# Patient Record
Sex: MALE
[De-identification: names, ages, dates, MRNs, and addresses within clinical notes are randomized per-mention and may not be internally consistent; named-entity substitution may affect disease eponyms.]

---

## 2021-08-26 ENCOUNTER — TRANSCRIPTION ENCOUNTER (OUTPATIENT)
Age: 66
End: 2021-08-26

## 2021-10-09 ENCOUNTER — TRANSCRIPTION ENCOUNTER (OUTPATIENT)
Age: 66
End: 2021-10-09

## 2022-07-24 ENCOUNTER — NON-APPOINTMENT (OUTPATIENT)
Age: 67
End: 2022-07-24

## 2023-12-22 ENCOUNTER — TRANSCRIPTION ENCOUNTER (OUTPATIENT)
Age: 68
End: 2023-12-22

## 2023-12-25 ENCOUNTER — HOSPITAL ENCOUNTER (INPATIENT)
Facility: HOSPITAL | Age: 68
LOS: 6 days | Discharge: HOME | DRG: 378 | End: 2023-12-31
Attending: EMERGENCY MEDICINE | Admitting: STUDENT IN AN ORGANIZED HEALTH CARE EDUCATION/TRAINING PROGRAM
Payer: MEDICARE

## 2023-12-25 DIAGNOSIS — D64.9 ANEMIA: ICD-10-CM

## 2023-12-25 DIAGNOSIS — K92.2 UPPER GI BLEED: ICD-10-CM

## 2023-12-25 DIAGNOSIS — K92.2 GASTROINTESTINAL HEMORRHAGE, UNSPECIFIED GASTROINTESTINAL HEMORRHAGE TYPE: Primary | ICD-10-CM

## 2023-12-25 PROBLEM — D62 ACUTE BLOOD LOSS ANEMIA (ABLA): Status: ACTIVE | Noted: 2023-12-25

## 2023-12-25 PROBLEM — R55 SYNCOPE: Status: ACTIVE | Noted: 2023-12-25

## 2023-12-25 PROBLEM — I25.10 CAD (CORONARY ARTERY DISEASE): Status: ACTIVE | Noted: 2023-12-25

## 2023-12-25 PROBLEM — R93.89 ABNORMAL CT OF THE CHEST: Chronic | Status: ACTIVE | Noted: 2019-02-27

## 2023-12-25 LAB
2HR DELTA HS TROPONIN: 1 NG/L
4HR DELTA HS TROPONIN: 0 NG/L
ABO GROUP BLD: NORMAL
ABO GROUP BLD: NORMAL
ANION GAP SERPL CALCULATED.3IONS-SCNC: 5 MMOL/L
ATRIAL RATE: 89 BPM
BASOPHILS # BLD AUTO: 0.04 THOUSANDS/ÂΜL (ref 0–0.1)
BASOPHILS NFR BLD AUTO: 0 % (ref 0–1)
BLD GP AB SCN SERPL QL: NEGATIVE
BUN SERPL-MCNC: 56 MG/DL (ref 5–25)
CALCIUM SERPL-MCNC: 7.7 MG/DL (ref 8.4–10.2)
CARDIAC TROPONIN I PNL SERPL HS: 8 NG/L
CARDIAC TROPONIN I PNL SERPL HS: 8 NG/L
CARDIAC TROPONIN I PNL SERPL HS: 9 NG/L
CHLORIDE SERPL-SCNC: 110 MMOL/L (ref 96–108)
CO2 SERPL-SCNC: 25 MMOL/L (ref 21–32)
CREAT SERPL-MCNC: 0.88 MG/DL (ref 0.6–1.3)
EOSINOPHIL # BLD AUTO: 0.06 THOUSAND/ÂΜL (ref 0–0.61)
EOSINOPHIL NFR BLD AUTO: 1 % (ref 0–6)
ERYTHROCYTE [DISTWIDTH] IN BLOOD BY AUTOMATED COUNT: 12.7 % (ref 11.6–15.1)
ERYTHROCYTE [DISTWIDTH] IN BLOOD BY AUTOMATED COUNT: 13.2 % (ref 11.6–15.1)
ERYTHROCYTE [DISTWIDTH] IN BLOOD BY AUTOMATED COUNT: 13.6 % (ref 11.6–15.1)
EXT FECAL OCCULT BLOOD SCREEN: POSITIVE
EXT. CONTROL: ABNORMAL
FERRITIN SERPL-MCNC: 111 NG/ML (ref 24–336)
GFR SERPL CREATININE-BSD FRML MDRD: 88 ML/MIN/1.73SQ M
GLUCOSE SERPL-MCNC: 124 MG/DL (ref 65–140)
HCT VFR BLD AUTO: 18.8 % (ref 36.5–49.3)
HCT VFR BLD AUTO: 19.5 % (ref 36.5–49.3)
HCT VFR BLD AUTO: 21.1 % (ref 36.5–49.3)
HGB BLD-MCNC: 6.2 G/DL (ref 12–17)
HGB BLD-MCNC: 6.6 G/DL (ref 12–17)
HGB BLD-MCNC: 7.2 G/DL (ref 12–17)
IMM GRANULOCYTES # BLD AUTO: 0.05 THOUSAND/UL (ref 0–0.2)
IMM GRANULOCYTES NFR BLD AUTO: 1 % (ref 0–2)
IRON SATN MFR SERPL: 65 % (ref 15–50)
IRON SERPL-MCNC: 153 UG/DL (ref 50–212)
LYMPHOCYTES # BLD AUTO: 2.04 THOUSANDS/ÂΜL (ref 0.6–4.47)
LYMPHOCYTES NFR BLD AUTO: 20 % (ref 14–44)
MAGNESIUM SERPL-MCNC: 1.8 MG/DL (ref 1.9–2.7)
MCH RBC QN AUTO: 30.7 PG (ref 26.8–34.3)
MCH RBC QN AUTO: 31 PG (ref 26.8–34.3)
MCH RBC QN AUTO: 31.2 PG (ref 26.8–34.3)
MCHC RBC AUTO-ENTMCNC: 33 G/DL (ref 31.4–37.4)
MCHC RBC AUTO-ENTMCNC: 33.8 G/DL (ref 31.4–37.4)
MCHC RBC AUTO-ENTMCNC: 34.1 G/DL (ref 31.4–37.4)
MCV RBC AUTO: 91 FL (ref 82–98)
MCV RBC AUTO: 91 FL (ref 82–98)
MCV RBC AUTO: 94 FL (ref 82–98)
MONOCYTES # BLD AUTO: 0.69 THOUSAND/ÂΜL (ref 0.17–1.22)
MONOCYTES NFR BLD AUTO: 7 % (ref 4–12)
NEUTROPHILS # BLD AUTO: 7.28 THOUSANDS/ÂΜL (ref 1.85–7.62)
NEUTS SEG NFR BLD AUTO: 71 % (ref 43–75)
NRBC BLD AUTO-RTO: 0 /100 WBCS
P AXIS: 73 DEGREES
PLATELET # BLD AUTO: 134 THOUSANDS/UL (ref 149–390)
PLATELET # BLD AUTO: 134 THOUSANDS/UL (ref 149–390)
PLATELET # BLD AUTO: 142 THOUSANDS/UL (ref 149–390)
PMV BLD AUTO: 10.8 FL (ref 8.9–12.7)
PMV BLD AUTO: 11.1 FL (ref 8.9–12.7)
PMV BLD AUTO: 11.2 FL (ref 8.9–12.7)
POTASSIUM SERPL-SCNC: 3.9 MMOL/L (ref 3.5–5.3)
PR INTERVAL: 162 MS
QRS AXIS: 77 DEGREES
QRSD INTERVAL: 96 MS
QT INTERVAL: 390 MS
QTC INTERVAL: 474 MS
RBC # BLD AUTO: 2 MILLION/UL (ref 3.88–5.62)
RBC # BLD AUTO: 2.15 MILLION/UL (ref 3.88–5.62)
RBC # BLD AUTO: 2.31 MILLION/UL (ref 3.88–5.62)
RH BLD: POSITIVE
RH BLD: POSITIVE
SODIUM SERPL-SCNC: 140 MMOL/L (ref 135–147)
SPECIMEN EXPIRATION DATE: NORMAL
T WAVE AXIS: 77 DEGREES
TIBC SERPL-MCNC: 236 UG/DL (ref 250–450)
UIBC SERPL-MCNC: 83 UG/DL (ref 155–355)
VENTRICULAR RATE: 89 BPM
WBC # BLD AUTO: 10.16 THOUSAND/UL (ref 4.31–10.16)
WBC # BLD AUTO: 11.01 THOUSAND/UL (ref 4.31–10.16)
WBC # BLD AUTO: 11.41 THOUSAND/UL (ref 4.31–10.16)

## 2023-12-25 PROCEDURE — 99233 SBSQ HOSP IP/OBS HIGH 50: CPT | Performed by: INTERNAL MEDICINE

## 2023-12-25 PROCEDURE — C9113 INJ PANTOPRAZOLE SODIUM, VIA: HCPCS | Performed by: EMERGENCY MEDICINE

## 2023-12-25 PROCEDURE — 82272 OCCULT BLD FECES 1-3 TESTS: CPT

## 2023-12-25 PROCEDURE — 86920 COMPATIBILITY TEST SPIN: CPT

## 2023-12-25 PROCEDURE — 86901 BLOOD TYPING SEROLOGIC RH(D): CPT | Performed by: EMERGENCY MEDICINE

## 2023-12-25 PROCEDURE — 99223 1ST HOSP IP/OBS HIGH 75: CPT | Performed by: INTERNAL MEDICINE

## 2023-12-25 PROCEDURE — 86850 RBC ANTIBODY SCREEN: CPT | Performed by: EMERGENCY MEDICINE

## 2023-12-25 PROCEDURE — 85027 COMPLETE CBC AUTOMATED: CPT | Performed by: INTERNAL MEDICINE

## 2023-12-25 PROCEDURE — 30233N1 TRANSFUSION OF NONAUTOLOGOUS RED BLOOD CELLS INTO PERIPHERAL VEIN, PERCUTANEOUS APPROACH: ICD-10-PCS | Performed by: INTERNAL MEDICINE

## 2023-12-25 PROCEDURE — 83540 ASSAY OF IRON: CPT

## 2023-12-25 PROCEDURE — 86900 BLOOD TYPING SEROLOGIC ABO: CPT | Performed by: EMERGENCY MEDICINE

## 2023-12-25 PROCEDURE — 85025 COMPLETE CBC W/AUTO DIFF WBC: CPT | Performed by: EMERGENCY MEDICINE

## 2023-12-25 PROCEDURE — 99285 EMERGENCY DEPT VISIT HI MDM: CPT

## 2023-12-25 PROCEDURE — 83735 ASSAY OF MAGNESIUM: CPT | Performed by: EMERGENCY MEDICINE

## 2023-12-25 PROCEDURE — C9113 INJ PANTOPRAZOLE SODIUM, VIA: HCPCS | Performed by: INTERNAL MEDICINE

## 2023-12-25 PROCEDURE — 82728 ASSAY OF FERRITIN: CPT

## 2023-12-25 PROCEDURE — 96360 HYDRATION IV INFUSION INIT: CPT

## 2023-12-25 PROCEDURE — P9016 RBC LEUKOCYTES REDUCED: HCPCS

## 2023-12-25 PROCEDURE — 99222 1ST HOSP IP/OBS MODERATE 55: CPT | Performed by: INTERNAL MEDICINE

## 2023-12-25 PROCEDURE — 80048 BASIC METABOLIC PNL TOTAL CA: CPT | Performed by: EMERGENCY MEDICINE

## 2023-12-25 PROCEDURE — 93005 ELECTROCARDIOGRAM TRACING: CPT

## 2023-12-25 PROCEDURE — 82270 OCCULT BLOOD FECES: CPT | Performed by: EMERGENCY MEDICINE

## 2023-12-25 PROCEDURE — 84484 ASSAY OF TROPONIN QUANT: CPT | Performed by: EMERGENCY MEDICINE

## 2023-12-25 PROCEDURE — 1123F ACP DISCUSS/DSCN MKR DOCD: CPT | Performed by: INTERNAL MEDICINE

## 2023-12-25 PROCEDURE — 36415 COLL VENOUS BLD VENIPUNCTURE: CPT | Performed by: EMERGENCY MEDICINE

## 2023-12-25 PROCEDURE — 99285 EMERGENCY DEPT VISIT HI MDM: CPT | Performed by: EMERGENCY MEDICINE

## 2023-12-25 PROCEDURE — 83550 IRON BINDING TEST: CPT

## 2023-12-25 RX ORDER — PANTOPRAZOLE SODIUM 40 MG/10ML
40 INJECTION, POWDER, LYOPHILIZED, FOR SOLUTION INTRAVENOUS EVERY 12 HOURS SCHEDULED
Status: DISCONTINUED | OUTPATIENT
Start: 2023-12-25 | End: 2023-12-25

## 2023-12-25 RX ORDER — PANTOPRAZOLE SODIUM 40 MG/10ML
40 INJECTION, POWDER, LYOPHILIZED, FOR SOLUTION INTRAVENOUS ONCE
Status: COMPLETED | OUTPATIENT
Start: 2023-12-25 | End: 2023-12-25

## 2023-12-25 RX ORDER — ATORVASTATIN CALCIUM 20 MG/1
20 TABLET, FILM COATED ORAL DAILY
COMMUNITY

## 2023-12-25 RX ORDER — SODIUM CHLORIDE, SODIUM GLUCONATE, SODIUM ACETATE, POTASSIUM CHLORIDE, MAGNESIUM CHLORIDE, SODIUM PHOSPHATE, DIBASIC, AND POTASSIUM PHOSPHATE .53; .5; .37; .037; .03; .012; .00082 G/100ML; G/100ML; G/100ML; G/100ML; G/100ML; G/100ML; G/100ML
75 INJECTION, SOLUTION INTRAVENOUS CONTINUOUS
Status: DISCONTINUED | OUTPATIENT
Start: 2023-12-25 | End: 2023-12-31 | Stop reason: HOSPADM

## 2023-12-25 RX ORDER — ATORVASTATIN CALCIUM 20 MG/1
20 TABLET, FILM COATED ORAL
Status: DISCONTINUED | OUTPATIENT
Start: 2023-12-25 | End: 2023-12-31 | Stop reason: HOSPADM

## 2023-12-25 RX ORDER — ACETAMINOPHEN 325 MG/1
650 TABLET ORAL EVERY 6 HOURS PRN
Status: DISCONTINUED | OUTPATIENT
Start: 2023-12-25 | End: 2023-12-31 | Stop reason: HOSPADM

## 2023-12-25 RX ADMIN — PANTOPRAZOLE SODIUM 40 MG: 40 INJECTION, POWDER, FOR SOLUTION INTRAVENOUS at 05:57

## 2023-12-25 RX ADMIN — SODIUM CHLORIDE 1000 ML: 0.9 INJECTION, SOLUTION INTRAVENOUS at 05:20

## 2023-12-25 RX ADMIN — SODIUM CHLORIDE, SODIUM GLUCONATE, SODIUM ACETATE, POTASSIUM CHLORIDE, MAGNESIUM CHLORIDE, SODIUM PHOSPHATE, DIBASIC, AND POTASSIUM PHOSPHATE 75 ML/HR: .53; .5; .37; .037; .03; .012; .00082 INJECTION, SOLUTION INTRAVENOUS at 14:15

## 2023-12-25 RX ADMIN — SODIUM CHLORIDE 8 MG/HR: 9 INJECTION, SOLUTION INTRAVENOUS at 22:35

## 2023-12-25 RX ADMIN — ATORVASTATIN CALCIUM 20 MG: 20 TABLET, FILM COATED ORAL at 18:51

## 2023-12-25 RX ADMIN — SODIUM CHLORIDE 8 MG/HR: 9 INJECTION, SOLUTION INTRAVENOUS at 14:20

## 2023-12-25 NOTE — PROGRESS NOTES
"On license of [facility]  Progress Note  Name: Ted Maynard I  MRN: [id number]  Unit/Bed#: -02 I Date of Admission: 12/25/2023   Date of Service: 12/25/2023 I Hospital Day: 0    Assessment/Plan   * GI bleed  Assessment & Plan  Patient presents to the ED for a syncopal episode at home earlier today. Reports feeling increasing tachycardia and lightheadedness when ambulating to the bathroom. His wife assisted him to the commode at home earlier today when she noted he had a syncopal episode on the toilet. Otherwise he reports having dark tarry stools since around Friday. He otherwise denies abd pain, chest pain, palpitations dizzy/lightheadedness at rest, SOB, or other acute symptom/complaint at this time.     BP 99/53   Pulse 85   Temp 98.6 °F (37 °C)   Resp 16   Ht 5' 11\" (1.803 m)   Wt 86.1 kg (189 lb 12.8 oz)   SpO2 97%   BMI 26.47 kg/m²     Hemoglobin 6.2 on admission  After 1U PRBC - 6.6  Pending transfusion of second unit of blood - continue to monitor CBC subsequently  Keep NPO for now  Protonix 40mg IV BID  GI on board, after clinical stabilization there is plan for eventual endoscopic evaluation  Continue to hold aspirin    Acute blood loss anemia (ABLA)  Assessment & Plan  Previously no hx of anemia but now with dark Bms presented with Hb of 6.2 and symptomatic  This is secondary to GI bleeding  Transfuse with a target hemoglobin above 7  Monitor closely for any further bleeding    Syncope  Assessment & Plan  The patient had a syncopal event likely due to orthostatic blood pressure due to bleeding  We will continue transfusion  Continue IV fluids  Monitor    CAD (coronary artery disease)  Assessment & Plan  S/p stent placement  Denies any chest pain  Currently ASA on hold due to GI bleeding  Continue home statin           VTE Pharmacologic Prophylaxis:   Pharmacologic: Pharmacologic VTE Prophylaxis contraindicated due to bleeding  Mechanical VTE Prophylaxis in Place: Yes    Patient " Centered Rounds: I have performed bedside rounds with nursing staff today.    Discussions with Specialists or Other Care Team Provider:   Discussed with care management team    Education and Discussions with Family / Patient:   Patient himself - he is Aaox4    Time Spent for Care: 45 minutes.  More than 50% of total time spent on counseling and coordination of care as described above.    Current Length of Stay: 0 day(s)    Current Patient Status: Inpatient   Certification Statement: The patient will continue to require additional inpatient hospital stay due to need for hemoglobin monitoring, blood transfusion, GI workup    Discharge Plan: 24-48h    Code Status: Level 1 - Full Code      Subjective:     Patient valuated this morning.  No further bloody bowel movements.  He does not feel lightheaded but he has not left the bed yet.  Receiving blood transfusion today.      Objective:     Vitals:   Temp (24hrs), Av °F (36.7 °C), Min:97.5 °F (36.4 °C), Max:98.6 °F (37 °C)    Temp:  [97.5 °F (36.4 °C)-98.6 °F (37 °C)] 98.6 °F (37 °C)  HR:  [] 85  Resp:  [16-18] 16  BP: ()/(47-62) 99/53  SpO2:  [97 %-100 %] 97 %  Body mass index is 26.47 kg/m².     Input and Output Summary (last 24 hours):       Intake/Output Summary (Last 24 hours) at 2023 1218  Last data filed at 2023 1101  Gross per 24 hour   Intake 287.5 ml   Output 700 ml   Net -412.5 ml       Physical Exam:     Physical Exam  Vitals and nursing note reviewed.   Constitutional:       General: He is not in acute distress.     Appearance: Normal appearance. He is not ill-appearing, toxic-appearing or diaphoretic.      Comments: Male patient in bed, awake   HENT:      Head: Normocephalic and atraumatic.      Right Ear: External ear normal.      Left Ear: External ear normal.      Nose: Nose normal. No congestion or rhinorrhea.      Mouth/Throat:      Mouth: Mucous membranes are dry.      Pharynx: Oropharynx is clear. No oropharyngeal exudate or  posterior oropharyngeal erythema.   Eyes:      General: No scleral icterus.        Right eye: No discharge.         Left eye: No discharge.      Pupils: Pupils are equal, round, and reactive to light.   Neck:      Vascular: No carotid bruit.   Cardiovascular:      Rate and Rhythm: Normal rate and regular rhythm.      Pulses: Normal pulses.      Heart sounds: No murmur heard.     No friction rub. No gallop.   Pulmonary:      Effort: Pulmonary effort is normal. No respiratory distress.      Breath sounds: Normal breath sounds. No stridor. No wheezing, rhonchi or rales.   Abdominal:      General: Abdomen is flat. Bowel sounds are normal. There is no distension.      Palpations: Abdomen is soft. There is no mass.      Tenderness: There is no abdominal tenderness. There is no guarding or rebound.      Hernia: No hernia is present.   Musculoskeletal:         General: No swelling, tenderness, deformity or signs of injury. Normal range of motion.      Cervical back: Normal range of motion. No rigidity. No muscular tenderness.   Lymphadenopathy:      Cervical: No cervical adenopathy.   Skin:     General: Skin is warm and dry.      Capillary Refill: Capillary refill takes less than 2 seconds.      Coloration: Skin is pale. Skin is not jaundiced.      Findings: No bruising or erythema.   Neurological:      General: No focal deficit present.      Mental Status: He is alert and oriented to person, place, and time. Mental status is at baseline.      Cranial Nerves: No cranial nerve deficit.      Sensory: No sensory deficit.      Motor: No weakness.      Coordination: Coordination normal.      Deep Tendon Reflexes: Reflexes normal.   Psychiatric:         Mood and Affect: Mood normal.         Behavior: Behavior normal.         Thought Content: Thought content normal.         Judgment: Judgment normal.           Additional Data:     Labs:    Results from last 7 days   Lab Units 12/25/23  1113 12/25/23  0519   WBC Thousand/uL 11.01*  10.16   HEMOGLOBIN g/dL 6.6* 6.2*   HEMATOCRIT % 19.5* 18.8*   PLATELETS Thousands/uL 134* 142*   NEUTROS PCT %  --  71   LYMPHS PCT %  --  20   MONOS PCT %  --  7   EOS PCT %  --  1     Results from last 7 days   Lab Units 12/25/23  0519   SODIUM mmol/L 140   POTASSIUM mmol/L 3.9   CHLORIDE mmol/L 110*   CO2 mmol/L 25   BUN mg/dL 56*   CREATININE mg/dL 0.88   ANION GAP mmol/L 5   CALCIUM mg/dL 7.7*   GLUCOSE RANDOM mg/dL 124                           * I Have Reviewed All Lab Data Listed Above.  * Additional Pertinent Lab Tests Reviewed: All Labs For Current Hospital Admission Reviewed      Recent Cultures (last 7 days):           Last 24 Hours Medication List:   Current Facility-Administered Medications   Medication Dose Route Frequency Provider Last Rate    acetaminophen  650 mg Oral Q6H PRN Jamaal Watts PA-C      atorvastatin  20 mg Oral Daily With Dinner Skyla Allen PA-C      multi-electrolyte  75 mL/hr Intravenous Continuous Hai Núñez MD      pantoprazole  40 mg Intravenous Q12H JONATHAN Jamaal Watts PA-C          Today, Patient Was Seen By: Hai Núñez MD    ** Please Note: Dictation voice to text software may have been used in the creation of this document. **

## 2023-12-25 NOTE — ASSESSMENT & PLAN NOTE
The patient had a syncopal event likely due to orthostatic blood pressure due to bleeding  We will continue transfusion  Continue IV fluids  Monitor

## 2023-12-25 NOTE — ED PROVIDER NOTES
History  Chief Complaint   Patient presents with    Rectal Bleeding     Pt came by EMS from family's home c/o blood in stool since Friday. Around 0400 pt got up to use the bathroom and became faint and diaphoretic. Denies N/V. Denies SOB and CP. Received 1000 NS en route.      This 68-year-old patient presents emergency department via EMS after vasovagal syncopal event at home.  For last few days he has been having some episodes of lightheadedness and dizziness when going to the bathroom especially at night.  He feels weak dizzy and lightheaded.  But tonight he actually did pass out.  He did not injure himself because his wife was able to catch him.  When the EMS tried to stand him up he became diaphoretic and sweaty and passed out in front of them.  He did have black tarry stools a few days ago.  He takes aspirin every day due to coronary disease and stent placements.      History provided by:  Patient   used: No        None       No past medical history on file.    No past surgical history on file.    No family history on file.  I have reviewed and agree with the history as documented.    No existing history information found.  No existing history information found.       Review of Systems   Constitutional:  Negative for chills and fever.   HENT:  Negative for ear pain and sore throat.    Eyes:  Negative for pain and visual disturbance.   Respiratory:  Negative for cough and shortness of breath.    Cardiovascular:  Negative for chest pain and palpitations.   Gastrointestinal:  Negative for abdominal pain, anal bleeding and vomiting.   Genitourinary:  Negative for dysuria and hematuria.   Musculoskeletal:  Negative for arthralgias and back pain.   Skin:  Negative for color change and rash.   Neurological:  Positive for syncope, weakness and light-headedness. Negative for seizures.   All other systems reviewed and are negative.      Physical Exam  Physical Exam  Vitals and nursing note reviewed.    Constitutional:       General: He is not in acute distress.     Appearance: Normal appearance. He is well-developed and normal weight.   HENT:      Head: Normocephalic and atraumatic.      Right Ear: External ear normal.      Left Ear: External ear normal.   Eyes:      Conjunctiva/sclera: Conjunctivae normal.   Cardiovascular:      Rate and Rhythm: Normal rate and regular rhythm.      Pulses: Normal pulses.      Heart sounds: Normal heart sounds. No murmur heard.  Pulmonary:      Effort: Pulmonary effort is normal. No respiratory distress.      Breath sounds: Normal breath sounds.   Abdominal:      Palpations: Abdomen is soft.      Tenderness: There is no abdominal tenderness.   Musculoskeletal:         General: No swelling.      Cervical back: Neck supple.   Skin:     General: Skin is warm and dry.      Capillary Refill: Capillary refill takes less than 2 seconds.      Coloration: Skin is pale.   Neurological:      General: No focal deficit present.      Mental Status: He is alert and oriented to person, place, and time.   Psychiatric:         Mood and Affect: Mood normal.         Thought Content: Thought content normal.         Judgment: Judgment normal.         Vital Signs  ED Triage Vitals [12/25/23 0513]   Temperature Pulse Respirations Blood Pressure SpO2   97.6 °F (36.4 °C) 88 18 122/62 100 %      Temp Source Heart Rate Source Patient Position - Orthostatic VS BP Location FiO2 (%)   Oral Monitor Lying Right arm --      Pain Score       --           Vitals:    12/25/23 0513 12/25/23 0515 12/25/23 0555   BP: 122/62 122/62 107/55   Pulse: 88 89 78   Patient Position - Orthostatic VS: Lying Sitting Sitting         Visual Acuity      ED Medications  Medications   pantoprazole (PROTONIX) injection 40 mg (has no administration in time range)   pantoprazole (PROTONIX) injection 40 mg (has no administration in time range)   acetaminophen (TYLENOL) tablet 650 mg (has no administration in time range)   sodium chloride  0.9 % bolus 1,000 mL (1,000 mL Intravenous New Bag 12/25/23 0520)       Diagnostic Studies  Results Reviewed       Procedure Component Value Units Date/Time    TIBC Panel (incl. Iron, TIBC, % Iron Saturation) [[id number]]     Lab Status: No result Specimen: Blood     Ferritin [[id number]]     Lab Status: No result Specimen: Blood     HS Troponin I 2hr [[id number]]     Lab Status: No result Specimen: Blood     HS Troponin 0hr (reflex protocol) [[id number]]  (Normal) Collected: 12/25/23 0519    Lab Status: Final result Specimen: Blood from Arm, Right Updated: 12/25/23 0550     hs TnI 0hr 8 ng/L     Basic metabolic panel [[id number]]  (Abnormal) Collected: 12/25/23 0519    Lab Status: Final result Specimen: Blood from Arm, Right Updated: 12/25/23 0543     Sodium 140 mmol/L      Potassium 3.9 mmol/L      Chloride 110 mmol/L      CO2 25 mmol/L      ANION GAP 5 mmol/L      BUN 56 mg/dL      Creatinine 0.88 mg/dL      Glucose 124 mg/dL      Calcium 7.7 mg/dL      eGFR 88 ml/min/1.73sq m     Narrative:      National Kid[employer] guidelines for Chronic Kidney Disease (CKD):     Stage 1 with normal or high GFR (GFR > 90 mL/min/1.73 square meters)    Stage 2 Mild CKD (GFR = 60-89 mL/min/1.73 square meters)    Stage 3A Moderate CKD (GFR = 45-59 mL/min/1.73 square meters)    Stage 3B Moderate CKD (GFR = 30-44 mL/min/1.73 square meters)    Stage 4 Severe CKD (GFR = 15-29 mL/min/1.73 square meters)    Stage 5 End Stage CKD (GFR <15 mL/min/1.73 square meters)  Note: GFR calculation is accurate only with a steady state creatinine    Magnesium [[id number]]  (Abnormal) Collected: 12/25/23 0519    Lab Status: Final result Specimen: Blood from Arm, Right Updated: 12/25/23 0543     Magnesium 1.8 mg/dL     CBC and differential [[id number]]  (Abnormal) Collected: 12/25/23 0519    Lab Status: Final result Specimen: Blood from Arm, Right Updated: 12/25/23 0539     WBC 10.16 Thousand/uL      RBC 2.00 Million/uL      Hemoglobin 6.2 g/dL       Hematocrit 18.8 %      MCV 94 fL      MCH 31.0 pg      MCHC 33.0 g/dL      RDW 12.7 %      MPV 11.2 fL      Platelets 142 Thousands/uL      nRBC 0 /100 WBCs      Neutrophils Relative 71 %      Immat GRANS % 1 %      Lymphocytes Relative 20 %      Monocytes Relative 7 %      Eosinophils Relative 1 %      Basophils Relative 0 %      Neutrophils Absolute 7.28 Thousands/µL      Immature Grans Absolute 0.05 Thousand/uL      Lymphocytes Absolute 2.04 Thousands/µL      Monocytes Absolute 0.69 Thousand/µL      Eosinophils Absolute 0.06 Thousand/µL      Basophils Absolute 0.04 Thousands/µL     Narrative:      This is an appended report.  These results have been appended to a previously verified report.    POCT occult blood stool [[id number]]  (Abnormal) Resulted: 12/25/23 0524    Lab Status: Final result Specimen: Stool Updated: 12/25/23 0524     EXT Fecal Occult Blood Positive     Control Valid                   No orders to display              Procedures  Procedures         ED Course  ED Course as of 12/25/23 0559   Mon Dec 25, 2023   0539 EXT Fecal Occult Blood (Ref: Negative)(!): Positive   0547 BUN(!): 56   0548 Creatinine: 0.88                               SBIRT 22yo+      Flowsheet Row Most Recent Value   Initial Alcohol Screen: US AUDIT-C     1. How often do you have a drink containing alcohol? 0 Filed at: 12/25/2023 0525   2. How many drinks containing alcohol do you have on a typical day you are drinking?  0 Filed at: 12/25/2023 0525   3a. Male UNDER 65: How often do you have five or more drinks on one occasion? 0 Filed at: 12/25/2023 0525   3b. FEMALE Any Age, or MALE 65+: How often do you have 4 or more drinks on one occassion? 0 Filed at: 12/25/2023 0525   Audit-C Score 0 Filed at: 12/25/2023 0525   WILMER: How many times in the past year have you...    Used an illegal drug or used a prescription medication for non-medical reasons? Never Filed at: 12/25/2023 0525                      Medical Decision  Making  Patient presents emergency department with a near syncopal event.  Differential diagnose includes but not limited to: Electrolyte deficient, dehydration, hypoglycemia, vasovagal,Seizure, renal insufficiency, hypertension.    Patient has presented to the Emergency Department with exacerbation of chronic condition / pt with new illness-injury that may poses a threat to life or body function.  At least 3 different tests have been ordered on this patient AND reviewed the results.   Old laboratory data (of at least 2 tests) were reviewed from the medical records and compared to today's results  Discussion with patient and/or family members of results (normal and abnormal) and the implications for immediate and long term treatment/management.  Due to the high risk of morbidity further diagnostic testing and treatment is necessary.    5:58 AM  I discussed the case with the hospitalist. We reviewed the HPI, pertinent PMH, ED course and management.   Hospitalist agreed with plan and will admit the patient to the hospital.    Medications  pantoprazole (PROTONIX) injection 40 mg (has no administration in time range)  pantoprazole (PROTONIX) injection 40 mg (has no administration in time range)  acetaminophen (TYLENOL) tablet 650 mg (has no administration in time range)  sodium chloride 0.9 % bolus 1,000 mL (1,000 mL Intravenous New Bag 12/25/23 0520)            Amount and/or Complexity of Data Reviewed  Labs: ordered. Decision-making details documented in ED Course.    Risk  Prescription drug management.  Decision regarding hospitalization.             Disposition  Final diagnoses:   Anemia   Upper GI bleed     Time reflects when diagnosis was documented in both MDM as applicable and the Disposition within this note       Time User Action Codes Description Comment    12/25/2023  5:53 AM Jamaal Watts Add [K92.2] Gastrointestinal hemorrhage, unspecified gastrointestinal hemorrhage type     12/25/2023  5:59 AM Chanel  Pedro Hammond [D64.9] Anemia     12/25/2023  5:59 AM Pedro Buchanan [K92.2] Upper GI bleed           ED Disposition       ED Disposition   Admit    Condition   Stable    Date/Time   Mon Dec 25, 2023 0557    Comment   Case was discussed with Hospitalist and the patient's admission status was agreed to be Admission Status: inpatient status to the service of Dr. Houston .               Follow-up Information    None         Patient's Medications    No medications on file       No discharge procedures on file.    PDMP Review       None            ED Provider  Electronically Signed by             Pedro Buchanan MD  12/25/23 0559

## 2023-12-25 NOTE — ASSESSMENT & PLAN NOTE
"Patient presents to the ED for a syncopal episode at home earlier today. Reports feeling increasing tachycardia and lightheadedness when ambulating to the bathroom. His wife assisted him to the commode at home earlier today when she noted he had a syncopal episode on the toilet. Otherwise he reports having dark tarry stools since around Friday. He otherwise denies abd pain, chest pain, palpitations dizzy/lightheadedness at rest, SOB, or other acute symptom/complaint at this time.     BP 99/53   Pulse 85   Temp 98.6 °F (37 °C)   Resp 16   Ht 5' 11\" (1.803 m)   Wt 86.1 kg (189 lb 12.8 oz)   SpO2 97%   BMI 26.47 kg/m²     Hemoglobin 6.2 on admission  After 1U PRBC - 6.6  Pending transfusion of second unit of blood - continue to monitor CBC subsequently  Keep NPO for now  Protonix 40mg IV BID  GI on board, after clinical stabilization there is plan for eventual endoscopic evaluation  Continue to hold aspirin  "

## 2023-12-25 NOTE — CONSULTS
Consultation -  [facility]  Ted Maynard 68 y.o. male MRN: [id number]  Unit/Bed#: -02 Encounter: [id number]         Reason for Consult / Principal Problem: Acute blood loss anemia    HPI: Ted is a 68-year-old male with history of CAD on 81 mg aspirin and hyperlipidemia.  Patient reports that on Friday after he had CT scans for abnormal echocardiogram in [place] where he resides, he had a black-colored bowel movement he reports.  At first, he thought that it had something to do with the contrast he was given.  Patient then decided to continue monitoring his symptoms and come to visit his family in the [facility].  Patient reports that over the weekend, he began to have dizziness and diaphoresis.  Eventually, the patient did syncopized but did not hit his head.  At that point, his wife encouraged him to come to the hospital.  He denies abdominal pain, heartburn, indigestion or unintentional weight loss.  Patient reports that he smokes a cigar when he golfs with his friends.  Otherwise, is not a regular smoker.  Has about 1 to 2 glasses of wine with dinner.    On admission hemoglobin 6.2.  BUN 56, creatinine 0.80.  Last hemoglobin available for review was from 2021, hemoglobin was 14.7 at that time.  Patient reports that his wife has access to his MyChart, but she was not at the bedside during the time of our encounter.    Per nursing, is having orthostatic hypotension when trying to get up to go to the bathroom.  Otherwise, patient lying comfortably in bed this morning.  No episodes of melena this morning.  Reports a previous colonoscopy 3 years ago in [place].    Review of Systems:    CONSTITUTIONAL: Denies any fever, chills, or rigors.   HEENT: No earache or tinnitus. Denies hearing loss or visual disturbances.  CARDIOVASCULAR: No chest pain or palpitations.   RESPIRATORY: Denies any cough, hemoptysis, shortness of breath or dyspnea on exertion.  GASTROINTESTINAL: As noted in the  "History of Present Illness.   GENITOURINARY: No problems with urination. Denies any hematuria or dysuria.  NEUROLOGIC: No dizziness or vertigo, denies headaches.   MUSCULOSKELETAL: Denies any muscle or joint pain.   SKIN: Denies skin rashes or itching.   ENDOCRINE: Denies excessive thirst. Denies intolerance to heat or cold.  PSYCHOSOCIAL: Denies depression or anxiety. Denies any recent memory loss.       Historical Information   History reviewed. No pertinent past medical history.  History reviewed. No pertinent surgical history.  Social History   Social History     Substance and Sexual Activity   Alcohol Use Yes    Alcohol/week: 2.0 standard drinks of alcohol    Types: 2 Glasses of wine per week     Social History     Substance and Sexual Activity   Drug Use Never     Social History     Tobacco Use   Smoking Status Never    Passive exposure: Never   Smokeless Tobacco Never     History reviewed. No pertinent family history.     Meds/Allergies     Current Facility-Administered Medications   Medication Dose Route Frequency    acetaminophen (TYLENOL) tablet 650 mg  650 mg Oral Q6H PRN    atorvastatin (LIPITOR) tablet 20 mg  20 mg Oral Daily With Dinner    pantoprazole (PROTONIX) injection 40 mg  40 mg Intravenous Q12H JONATHAN       No Known Allergies      Objective     Blood pressure 108/55, pulse 91, temperature 97.9 °F (36.6 °C), resp. rate 18, height 5' 11\" (1.803 m), weight 86.1 kg (189 lb 12.8 oz), SpO2 100%.    No intake or output data in the 24 hours ending 12/25/23 0807      PHYSICAL EXAM:      General Appearance:   Alert and oriented x 3. Cooperative, and in no respiratory distress   HEENT:   Normocephalic, atraumatic, anicteric.     Neck:  Supple, symmetrical, trachea midline   Lungs:   Clear to auscultation bilaterally; no rales, rhonchi or wheezing; respirations unlabored    Heart::   S1 and S2 normal; regular rate and rhythm; no murmur, rub, or gallop.   Abdomen:   Soft, non-tender, non-distended; normal " bowel sounds; no masses, no organomegaly    Genitalia:   Deferred    Rectal:   Deferred    Extremities:  No cyanosis, clubbing or edema    Pulses:  2+ and symmetric all extremities    Skin:  Skin color, texture, turgor normal, no rashes or lesions    Lymph nodes:  No palpable cervical or supraclavicular lymphadenopathy        Lab Results:   Results from last 7 days   Lab Units 12/25/23  0519   WBC Thousand/uL 10.16   HEMOGLOBIN g/dL 6.2*   HEMATOCRIT % 18.8*   PLATELETS Thousands/uL 142*   NEUTROS PCT % 71   LYMPHS PCT % 20   MONOS PCT % 7   EOS PCT % 1     Results from last 7 days   Lab Units 12/25/23  0519   POTASSIUM mmol/L 3.9   CHLORIDE mmol/L 110*   CO2 mmol/L 25   BUN mg/dL 56*   CREATININE mg/dL 0.88   CALCIUM mg/dL 7.7*               Imaging Studies:  No results found.    ASSESSMENT and PLAN:      1) Acute blood loss anemia and melena, elevated BUN concerning for upper GI bleed - Fortunately, the patient is not currently showing signs of active overt GI bleeding.  Last episode of melena was over the weekend, and reports that the patient to strain in order to have a bowel movement.  Patient with orthostatic hypotension.  Denies abdominal pain, heartburn, dysphagia, NSAID use or alcohol abuse.  No prior endoscopy.  Patient currently receiving 1 unit of packed red blood cells, 2 ordered total.   - Check hemoglobin after first unit of packed red blood cell infusion, if not responding may need emergent endoscopy today to investigate  - Continue to monitor hemodynamics closely  - PPI IV twice daily  - NPO for now until patient is seen by Dr. Cotto   - If endoscopy is unremarkable during admission, would recommend colonoscopy     The patient was seen and examined by Dr. Cotto, all monte medical decisions were made with Dr. Cotto.  Thank you for allowing us to participate in the care of this pleasant patient.  We will follow up with you closely.    Portions of the record may have been created with voice recognition  "software.  Occasional wrong word or \"sound a like\" substitutions may have occurred due to the inherent limitations of voice recognition software.  Read the chart carefully and recognize, using context, where substitutions have occurred.  "

## 2023-12-25 NOTE — ASSESSMENT & PLAN NOTE
ECG: NSR without acute ischemic changes  Troponin level: 0hr 8  Etiology likely in the setting of low hemoglobin, 6.2  BG stable  No obvious source of infection  Order 1U PRBC  Monitor CBC Q8H  Orthostatic BP  Monitor on telemetry

## 2023-12-25 NOTE — ASSESSMENT & PLAN NOTE
Patient presents to the ED for a syncopal episode at home earlier today. Reports feeling increasing tachycardia and lightheadedness when ambulating to the bathroom. His wife assisted him to the commode at home earlier today when she noted he had a syncopal episode on the toilet. Otherwise he reports having dark tarry stools since around Friday. He otherwise denies abd pain, chest pain, palpitations dizzy/lightheadedness at rest, SOB, or other acute symptom/complaint at this time.     /55 (BP Location: Right arm)   Pulse 78   Temp 97.5 °F (36.4 °C) (Oral)   Resp 17   Wt 87.4 kg (192 lb 10.9 oz)   SpO2 99%     Hemoglobin 6.2 on admission  Order 1U PRBC  Holding home ASA  Start on IV protonix 40mg BID  Occult blood study  CBC Q8H  Keep NPO  Consult to GI, recommendations are appreciated

## 2023-12-25 NOTE — ASSESSMENT & PLAN NOTE
Previously no hx of anemia but now with dark Bms presented with Hb of 6.2 and symptomatic  This is secondary to GI bleeding  Transfuse with a target hemoglobin above 7  Monitor closely for any further bleeding

## 2023-12-25 NOTE — ASSESSMENT & PLAN NOTE
S/p stent placement  Denies any chest pain  Currently ASA on hold due to GI bleeding  Continue home statin

## 2023-12-25 NOTE — H&P
[facility]  H&P  Name: Ted Maynard 68 y.o. male I MRN: [id number]  Unit/Bed#: ED 13 I Date of Admission: 12/25/2023   Date of Service: 12/25/2023 I Hospital Day: 0      Assessment/Plan   * GI bleed  Assessment & Plan  Patient presents to the ED for a syncopal episode at home earlier today. Reports feeling increasing tachycardia and lightheadedness when ambulating to the bathroom. His wife assisted him to the commode at home earlier today when she noted he had a syncopal episode on the toilet. Otherwise he reports having dark tarry stools since around Friday. He otherwise denies abd pain, chest pain, palpitations dizzy/lightheadedness at rest, SOB, or other acute symptom/complaint at this time.     /55 (BP Location: Right arm)   Pulse 78   Temp 97.5 °F (36.4 °C) (Oral)   Resp 17   Wt 87.4 kg (192 lb 10.9 oz)   SpO2 99%     Hemoglobin 6.2 on admission  Order 1U PRBC  Holding home ASA  Start on IV protonix 40mg BID  Occult blood study  CBC Q8H  Keep NPO  Consult to GI, recommendations are appreciated    Syncope  Assessment & Plan  ECG: NSR without acute ischemic changes  Troponin level: 0hr 8  Etiology likely in the setting of low hemoglobin, 6.2  BG stable  No obvious source of infection  Order 1U PRBC  Monitor CBC Q8H  Orthostatic BP  Monitor on telemetry    CAD (coronary artery disease)  Assessment & Plan  S/p stent placement  Denies any chest pain  Currently ASA on hold due to GI bleeding  Continue home statin         VTE Pharmacologic Prophylaxis:   Moderate Risk (Score 3-4) - Pharmacological DVT Prophylaxis Contraindicated. Sequential Compression Devices Ordered.  Code Status: Level 1 - Full Code   Discussion with family:  Update in the AM.     Anticipated Length of Stay: Patient will be admitted on an inpatient basis with an anticipated length of stay of greater than 2 midnights secondary to GI bleed.      Chief Complaint: Bloody stools, syncope    History of Present  Illness:  Ted Maynard is a 68 y.o. male with a PMH of coronary artery disease and hyperlipidemia.  Patient presents to the ED for a syncopal episode at home earlier today. Reports feeling increasing tachycardia and lightheadedness when ambulating to the bathroom. His wife assisted him to the commode at home earlier today when she noted he had a syncopal episode on the toilet. Otherwise he reports having dark tarry stools since around Friday. He otherwise denies abd pain, chest pain, palpitations dizzy/lightheadedness at rest, SOB, or other acute symptom/complaint at this time. All patient questions answered to the best of my ability.    Review of Systems:  Review of Systems   Constitutional:  Negative for chills and fever.   HENT:  Negative for ear pain and sore throat.    Eyes:  Negative for pain and visual disturbance.   Respiratory:  Negative for cough and shortness of breath.    Cardiovascular:  Negative for chest pain and palpitations.   Gastrointestinal:  Positive for blood in stool. Negative for abdominal pain and vomiting.   Genitourinary:  Negative for dysuria and hematuria.   Musculoskeletal:  Negative for arthralgias and back pain.   Skin:  Negative for color change and rash.   Neurological:  Positive for syncope. Negative for seizures.   All other systems reviewed and are negative.      Past Medical and Surgical History:   No past medical history on file.    No past surgical history on file.    Meds/Allergies:  Prior to Admission medications    Not on File     I have reviewed home medications using recent Epic encounter.    Allergies: Not on File    Social History:  Marital Status: /Civil Union   Occupation: NA  Patient Pre-hospital Living Situation: Home  Patient Pre-hospital Level of Mobility: walks  Patient Pre-hospital Diet Restrictions: None  Substance Use History:   Social History     Substance and Sexual Activity   Alcohol Use Not on file     Social History     Tobacco Use   Smoking  Status Not on file   Smokeless Tobacco Not on file     Social History     Substance and Sexual Activity   Drug Use Not on file       Family History:  No family history on file.    Physical Exam:     Vitals:   Blood Pressure: 107/55 (12/25/23 0555)  Pulse: 78 (12/25/23 0555)  Temperature: 97.5 °F (36.4 °C) (12/25/23 0555)  Temp Source: Oral (12/25/23 0555)  Respirations: 17 (12/25/23 0555)  Weight - Scale: 87.4 kg (192 lb 10.9 oz) (12/25/23 0525)  SpO2: 99 % (12/25/23 0555)    Physical Exam  Vitals and nursing note reviewed.   Constitutional:       General: He is not in acute distress.     Appearance: He is well-developed.   HENT:      Head: Normocephalic and atraumatic.   Eyes:      Conjunctiva/sclera: Conjunctivae normal.   Cardiovascular:      Rate and Rhythm: Normal rate and regular rhythm.      Heart sounds: No murmur heard.  Pulmonary:      Effort: Pulmonary effort is normal. No respiratory distress.      Breath sounds: Normal breath sounds.   Abdominal:      Palpations: Abdomen is soft.      Tenderness: There is no abdominal tenderness.   Musculoskeletal:         General: No swelling.      Cervical back: Neck supple.   Skin:     General: Skin is warm and dry.      Capillary Refill: Capillary refill takes less than 2 seconds.      Coloration: Skin is pale.   Neurological:      Mental Status: He is alert. Mental status is at baseline.   Psychiatric:         Mood and Affect: Mood normal.         Additional Data:     Lab Results:  Results from last 7 days   Lab Units 12/25/23  0519   WBC Thousand/uL 10.16   HEMOGLOBIN g/dL 6.2*   HEMATOCRIT % 18.8*   PLATELETS Thousands/uL 142*   NEUTROS PCT % 71   LYMPHS PCT % 20   MONOS PCT % 7   EOS PCT % 1     Results from last 7 days   Lab Units 12/25/23  0519   SODIUM mmol/L 140   POTASSIUM mmol/L 3.9   CHLORIDE mmol/L 110*   CO2 mmol/L 25   BUN mg/dL 56*   CREATININE mg/dL 0.88   ANION GAP mmol/L 5   CALCIUM mg/dL 7.7*   GLUCOSE RANDOM mg/dL 124                        Lines/Drains:  Invasive Devices       Peripheral Intravenous Line  Duration             Peripheral IV 12/25/23 Distal;Right;Upper;Ventral (anterior) Arm <1 day    Peripheral IV 12/25/23 Left;Ventral (anterior) Forearm <1 day                        Imaging: No pertinent imaging reviewed.  No orders to display       EKG and Other Studies Reviewed on Admission:   EKG:  NSR without acute ischemic changes.    ** Please Note: This note has been constructed using a voice recognition system. **

## 2023-12-26 ENCOUNTER — APPOINTMENT (INPATIENT)
Dept: GASTROENTEROLOGY | Facility: HOSPITAL | Age: 68
DRG: 378 | End: 2023-12-26
Attending: INTERNAL MEDICINE
Payer: MEDICARE

## 2023-12-26 ENCOUNTER — ANESTHESIA (INPATIENT)
Dept: GASTROENTEROLOGY | Facility: HOSPITAL | Age: 68
DRG: 378 | End: 2023-12-26
Payer: MEDICARE

## 2023-12-26 ENCOUNTER — ANESTHESIA EVENT (INPATIENT)
Dept: GASTROENTEROLOGY | Facility: HOSPITAL | Age: 68
DRG: 378 | End: 2023-12-26
Payer: MEDICARE

## 2023-12-26 PROBLEM — J44.9 CHRONIC OBSTRUCTIVE PULMONARY DISEASE (HCC): Status: ACTIVE | Noted: 2023-12-26

## 2023-12-26 LAB
ABO GROUP BLD BPU: NORMAL
ALBUMIN SERPL BCP-MCNC: 2.9 G/DL (ref 3.5–5)
ALP SERPL-CCNC: 34 U/L (ref 34–104)
ALT SERPL W P-5'-P-CCNC: 11 U/L (ref 7–52)
ANION GAP SERPL CALCULATED.3IONS-SCNC: 2 MMOL/L
AST SERPL W P-5'-P-CCNC: 10 U/L (ref 13–39)
BASOPHILS # BLD AUTO: 0.03 THOUSANDS/ÂΜL (ref 0–0.1)
BASOPHILS NFR BLD AUTO: 0 % (ref 0–1)
BILIRUB SERPL-MCNC: 0.45 MG/DL (ref 0.2–1)
BPU ID: NORMAL
BUN SERPL-MCNC: 39 MG/DL (ref 5–25)
CALCIUM ALBUM COR SERPL-MCNC: 8.6 MG/DL (ref 8.3–10.1)
CALCIUM SERPL-MCNC: 7.7 MG/DL (ref 8.4–10.2)
CHLORIDE SERPL-SCNC: 110 MMOL/L (ref 96–108)
CO2 SERPL-SCNC: 26 MMOL/L (ref 21–32)
CREAT SERPL-MCNC: 0.91 MG/DL (ref 0.6–1.3)
CROSSMATCH: NORMAL
EOSINOPHIL # BLD AUTO: 0.12 THOUSAND/ÂΜL (ref 0–0.61)
EOSINOPHIL NFR BLD AUTO: 1 % (ref 0–6)
ERYTHROCYTE [DISTWIDTH] IN BLOOD BY AUTOMATED COUNT: 13.5 % (ref 11.6–15.1)
ERYTHROCYTE [DISTWIDTH] IN BLOOD BY AUTOMATED COUNT: 13.7 % (ref 11.6–15.1)
ERYTHROCYTE [DISTWIDTH] IN BLOOD BY AUTOMATED COUNT: 13.7 % (ref 11.6–15.1)
ERYTHROCYTE [DISTWIDTH] IN BLOOD BY AUTOMATED COUNT: 13.8 % (ref 11.6–15.1)
GFR SERPL CREATININE-BSD FRML MDRD: 86 ML/MIN/1.73SQ M
GLUCOSE SERPL-MCNC: 108 MG/DL (ref 65–140)
GLUCOSE SERPL-MCNC: 117 MG/DL (ref 65–140)
HCT VFR BLD AUTO: 17.9 % (ref 36.5–49.3)
HCT VFR BLD AUTO: 20.3 % (ref 36.5–49.3)
HCT VFR BLD AUTO: 21.5 % (ref 36.5–49.3)
HCT VFR BLD AUTO: 22.4 % (ref 36.5–49.3)
HCT VFR BLD AUTO: 23.1 % (ref 36.5–49.3)
HGB BLD-MCNC: 6.2 G/DL (ref 12–17)
HGB BLD-MCNC: 6.9 G/DL (ref 12–17)
HGB BLD-MCNC: 7.3 G/DL (ref 12–17)
HGB BLD-MCNC: 7.8 G/DL (ref 12–17)
HGB BLD-MCNC: 7.8 G/DL (ref 12–17)
IMM GRANULOCYTES # BLD AUTO: 0.06 THOUSAND/UL (ref 0–0.2)
IMM GRANULOCYTES NFR BLD AUTO: 1 % (ref 0–2)
LDH SERPL-CCNC: 92 U/L (ref 140–271)
LYMPHOCYTES # BLD AUTO: 2.7 THOUSANDS/ÂΜL (ref 0.6–4.47)
LYMPHOCYTES NFR BLD AUTO: 30 % (ref 14–44)
MCH RBC QN AUTO: 30.9 PG (ref 26.8–34.3)
MCH RBC QN AUTO: 31.1 PG (ref 26.8–34.3)
MCH RBC QN AUTO: 31.8 PG (ref 26.8–34.3)
MCH RBC QN AUTO: 31.8 PG (ref 26.8–34.3)
MCHC RBC AUTO-ENTMCNC: 33.8 G/DL (ref 31.4–37.4)
MCHC RBC AUTO-ENTMCNC: 34 G/DL (ref 31.4–37.4)
MCHC RBC AUTO-ENTMCNC: 34.6 G/DL (ref 31.4–37.4)
MCHC RBC AUTO-ENTMCNC: 34.8 G/DL (ref 31.4–37.4)
MCV RBC AUTO: 91 FL (ref 82–98)
MCV RBC AUTO: 91 FL (ref 82–98)
MCV RBC AUTO: 92 FL (ref 82–98)
MCV RBC AUTO: 92 FL (ref 82–98)
MONOCYTES # BLD AUTO: 0.7 THOUSAND/ÂΜL (ref 0.17–1.22)
MONOCYTES NFR BLD AUTO: 8 % (ref 4–12)
NEUTROPHILS # BLD AUTO: 5.35 THOUSANDS/ÂΜL (ref 1.85–7.62)
NEUTS SEG NFR BLD AUTO: 60 % (ref 43–75)
NRBC BLD AUTO-RTO: 0 /100 WBCS
PLATELET # BLD AUTO: 103 THOUSANDS/UL (ref 149–390)
PLATELET # BLD AUTO: 120 THOUSANDS/UL (ref 149–390)
PLATELET # BLD AUTO: 121 THOUSANDS/UL (ref 149–390)
PLATELET # BLD AUTO: 126 THOUSANDS/UL (ref 149–390)
PMV BLD AUTO: 10.8 FL (ref 8.9–12.7)
PMV BLD AUTO: 10.9 FL (ref 8.9–12.7)
PMV BLD AUTO: 10.9 FL (ref 8.9–12.7)
PMV BLD AUTO: 11.1 FL (ref 8.9–12.7)
POTASSIUM SERPL-SCNC: 3.8 MMOL/L (ref 3.5–5.3)
PROT SERPL-MCNC: 4.5 G/DL (ref 6.4–8.4)
RBC # BLD AUTO: 1.95 MILLION/UL (ref 3.88–5.62)
RBC # BLD AUTO: 2.23 MILLION/UL (ref 3.88–5.62)
RBC # BLD AUTO: 2.45 MILLION/UL (ref 3.88–5.62)
RBC # BLD AUTO: 2.51 MILLION/UL (ref 3.88–5.62)
SODIUM SERPL-SCNC: 138 MMOL/L (ref 135–147)
UNIT DISPENSE STATUS: NORMAL
UNIT PRODUCT CODE: NORMAL
UNIT PRODUCT VOLUME: 350 ML
UNIT RH: NORMAL
WBC # BLD AUTO: 10.04 THOUSAND/UL (ref 4.31–10.16)
WBC # BLD AUTO: 8.48 THOUSAND/UL (ref 4.31–10.16)
WBC # BLD AUTO: 8.96 THOUSAND/UL (ref 4.31–10.16)
WBC # BLD AUTO: 9.5 THOUSAND/UL (ref 4.31–10.16)

## 2023-12-26 PROCEDURE — C9113 INJ PANTOPRAZOLE SODIUM, VIA: HCPCS | Performed by: INTERNAL MEDICINE

## 2023-12-26 PROCEDURE — 43270 EGD LESION ABLATION: CPT | Performed by: INTERNAL MEDICINE

## 2023-12-26 PROCEDURE — P9040 RBC LEUKOREDUCED IRRADIATED: HCPCS

## 2023-12-26 PROCEDURE — 0W3P8ZZ CONTROL BLEEDING IN GASTROINTESTINAL TRACT, VIA NATURAL OR ARTIFICIAL OPENING ENDOSCOPIC: ICD-10-PCS | Performed by: INTERNAL MEDICINE

## 2023-12-26 PROCEDURE — 30233N1 TRANSFUSION OF NONAUTOLOGOUS RED BLOOD CELLS INTO PERIPHERAL VEIN, PERCUTANEOUS APPROACH: ICD-10-PCS | Performed by: INTERNAL MEDICINE

## 2023-12-26 PROCEDURE — 85025 COMPLETE CBC W/AUTO DIFF WBC: CPT | Performed by: INTERNAL MEDICINE

## 2023-12-26 PROCEDURE — 85027 COMPLETE CBC AUTOMATED: CPT | Performed by: INTERNAL MEDICINE

## 2023-12-26 PROCEDURE — 85014 HEMATOCRIT: CPT | Performed by: NURSE PRACTITIONER

## 2023-12-26 PROCEDURE — 83615 LACTATE (LD) (LDH) ENZYME: CPT | Performed by: INTERNAL MEDICINE

## 2023-12-26 PROCEDURE — 43239 EGD BIOPSY SINGLE/MULTIPLE: CPT | Performed by: INTERNAL MEDICINE

## 2023-12-26 PROCEDURE — 85018 HEMOGLOBIN: CPT | Performed by: NURSE PRACTITIONER

## 2023-12-26 PROCEDURE — 88305 TISSUE EXAM BY PATHOLOGIST: CPT | Performed by: PATHOLOGY

## 2023-12-26 PROCEDURE — 0DB78ZX EXCISION OF STOMACH, PYLORUS, VIA NATURAL OR ARTIFICIAL OPENING ENDOSCOPIC, DIAGNOSTIC: ICD-10-PCS | Performed by: INTERNAL MEDICINE

## 2023-12-26 PROCEDURE — 80053 COMPREHEN METABOLIC PANEL: CPT | Performed by: INTERNAL MEDICINE

## 2023-12-26 PROCEDURE — 0DB68ZX EXCISION OF STOMACH, VIA NATURAL OR ARTIFICIAL OPENING ENDOSCOPIC, DIAGNOSTIC: ICD-10-PCS | Performed by: INTERNAL MEDICINE

## 2023-12-26 PROCEDURE — 82948 REAGENT STRIP/BLOOD GLUCOSE: CPT

## 2023-12-26 PROCEDURE — 99232 SBSQ HOSP IP/OBS MODERATE 35: CPT | Performed by: INTERNAL MEDICINE

## 2023-12-26 RX ORDER — PANTOPRAZOLE SODIUM 40 MG/10ML
40 INJECTION, POWDER, LYOPHILIZED, FOR SOLUTION INTRAVENOUS EVERY 12 HOURS
Status: DISCONTINUED | OUTPATIENT
Start: 2023-12-26 | End: 2023-12-31 | Stop reason: HOSPADM

## 2023-12-26 RX ORDER — EPINEPHRINE 1 MG/ML
INJECTION, SOLUTION, CONCENTRATE INTRAVENOUS AS NEEDED
Status: COMPLETED | OUTPATIENT
Start: 2023-12-26 | End: 2023-12-26

## 2023-12-26 RX ORDER — LIDOCAINE HYDROCHLORIDE 20 MG/ML
INJECTION, SOLUTION EPIDURAL; INFILTRATION; INTRACAUDAL; PERINEURAL AS NEEDED
Status: DISCONTINUED | OUTPATIENT
Start: 2023-12-26 | End: 2023-12-26

## 2023-12-26 RX ORDER — POLYETHYLENE GLYCOL 3350 17 G/17G
238 POWDER, FOR SOLUTION ORAL ONCE
Status: COMPLETED | OUTPATIENT
Start: 2023-12-26 | End: 2023-12-26

## 2023-12-26 RX ORDER — DIPHENHYDRAMINE HYDROCHLORIDE 50 MG/ML
25 INJECTION INTRAMUSCULAR; INTRAVENOUS ONCE
Status: COMPLETED | OUTPATIENT
Start: 2023-12-26 | End: 2023-12-26

## 2023-12-26 RX ORDER — PROPOFOL 10 MG/ML
INJECTION, EMULSION INTRAVENOUS AS NEEDED
Status: DISCONTINUED | OUTPATIENT
Start: 2023-12-26 | End: 2023-12-26

## 2023-12-26 RX ORDER — PHENYLEPHRINE HCL IN 0.9% NACL 1 MG/10 ML
SYRINGE (ML) INTRAVENOUS AS NEEDED
Status: DISCONTINUED | OUTPATIENT
Start: 2023-12-26 | End: 2023-12-26

## 2023-12-26 RX ORDER — SODIUM CHLORIDE, SODIUM LACTATE, POTASSIUM CHLORIDE, CALCIUM CHLORIDE 600; 310; 30; 20 MG/100ML; MG/100ML; MG/100ML; MG/100ML
INJECTION, SOLUTION INTRAVENOUS CONTINUOUS PRN
Status: DISCONTINUED | OUTPATIENT
Start: 2023-12-26 | End: 2023-12-26

## 2023-12-26 RX ADMIN — DIPHENHYDRAMINE HYDROCHLORIDE 25 MG: 50 INJECTION, SOLUTION INTRAMUSCULAR; INTRAVENOUS at 01:12

## 2023-12-26 RX ADMIN — ATORVASTATIN CALCIUM 20 MG: 20 TABLET, FILM COATED ORAL at 17:41

## 2023-12-26 RX ADMIN — PANTOPRAZOLE SODIUM 40 MG: 40 INJECTION, POWDER, FOR SOLUTION INTRAVENOUS at 17:41

## 2023-12-26 RX ADMIN — SODIUM CHLORIDE, SODIUM LACTATE, POTASSIUM CHLORIDE, AND CALCIUM CHLORIDE: .6; .31; .03; .02 INJECTION, SOLUTION INTRAVENOUS at 13:58

## 2023-12-26 RX ADMIN — PROPOFOL 40 MG: 10 INJECTION, EMULSION INTRAVENOUS at 14:11

## 2023-12-26 RX ADMIN — LIDOCAINE HYDROCHLORIDE 100 MG: 20 INJECTION, SOLUTION EPIDURAL; INFILTRATION; INTRACAUDAL; PERINEURAL at 14:04

## 2023-12-26 RX ADMIN — Medication 200 MCG: at 14:06

## 2023-12-26 RX ADMIN — PROPOFOL 30 MG: 10 INJECTION, EMULSION INTRAVENOUS at 14:08

## 2023-12-26 RX ADMIN — PROPOFOL 30 MG: 10 INJECTION, EMULSION INTRAVENOUS at 14:06

## 2023-12-26 RX ADMIN — PROPOFOL 40 MG: 10 INJECTION, EMULSION INTRAVENOUS at 14:19

## 2023-12-26 RX ADMIN — SODIUM CHLORIDE, SODIUM GLUCONATE, SODIUM ACETATE, POTASSIUM CHLORIDE, MAGNESIUM CHLORIDE, SODIUM PHOSPHATE, DIBASIC, AND POTASSIUM PHOSPHATE 75 ML/HR: .53; .5; .37; .037; .03; .012; .00082 INJECTION, SOLUTION INTRAVENOUS at 02:44

## 2023-12-26 RX ADMIN — PROPOFOL 40 MG: 10 INJECTION, EMULSION INTRAVENOUS at 14:15

## 2023-12-26 RX ADMIN — PROPOFOL 100 MG: 10 INJECTION, EMULSION INTRAVENOUS at 14:04

## 2023-12-26 RX ADMIN — POLYETHYLENE GLYCOL 3350 238 G: 17 POWDER, FOR SOLUTION ORAL at 18:00

## 2023-12-26 RX ADMIN — IRON SUCROSE 300 MG: 20 INJECTION, SOLUTION INTRAVENOUS at 18:24

## 2023-12-26 RX ADMIN — EPINEPHRINE 0.8 MG: 1 INJECTION, SOLUTION INTRAMUSCULAR; SUBCUTANEOUS at 14:09

## 2023-12-26 RX ADMIN — Medication 100 MCG: at 14:18

## 2023-12-26 RX ADMIN — SODIUM CHLORIDE, SODIUM GLUCONATE, SODIUM ACETATE, POTASSIUM CHLORIDE, MAGNESIUM CHLORIDE, SODIUM PHOSPHATE, DIBASIC, AND POTASSIUM PHOSPHATE 75 ML/HR: .53; .5; .37; .037; .03; .012; .00082 INJECTION, SOLUTION INTRAVENOUS at 17:42

## 2023-12-26 NOTE — ASSESSMENT & PLAN NOTE
No previous history of anemia initially presented with bowel movements hemoglobin was 6.2  Since received 4 units packed red blood cells now hemoglobin 7.8 no further bloody bowel movements  Continue to monitor hemoglobin  Plan for EGD \later this afternoon

## 2023-12-26 NOTE — QUICK NOTE
Critical lab for hemoglobin of 6.2.  Patient had his last CBC at 1000 am this morning with a hemoglobin of 7.8.  Patient did have active bleeding noted on his EGD this afternoon, however, concern that this may be diluted by his IVF as his blood work was drawn above his IV which has running at the time of lab work.     Patient is agreeable to blood transfusion, however, will re-check a hemoglobin/hematocrit first.    This was discussed with patient, spouse and RN.

## 2023-12-26 NOTE — ASSESSMENT & PLAN NOTE
Status post PCI  Currently without chest pain  Continue to hold aspirin for now due to GI bleeding  Continue statin

## 2023-12-26 NOTE — ASSESSMENT & PLAN NOTE
Patient initially presented to the ED after a syncopal episode earlier at home noted increasing tachycardia and lightheadedness when ambulating noted dark tarry stools as well since Friday  Hemoglobin noted to be 6.2 on arrival appears to be normal at baseline  Received 4 units in total and packed red blood cells  Hemoglobin stabilizing approximately 7-8  Continue IV Protonix drip  Serial hemoglobin  N.p.o.  Plan for EGD later today  Follow-up GI

## 2023-12-26 NOTE — PROGRESS NOTES
[facility]  Progress Note  Name: Ted Maynard I  MRN: [id number]  Unit/Bed#: -02 I Date of Admission: 12/25/2023   Date of Service: 12/26/2023 I Hospital Day: 1    Assessment/Plan   * GI bleed  Assessment & Plan  Patient initially presented to the ED after a syncopal episode earlier at home noted increasing tachycardia and lightheadedness when ambulating noted dark tarry stools as well since Friday  Hemoglobin noted to be 6.2 on arrival appears to be normal at baseline  Received 4 units in total and packed red blood cells  Hemoglobin stabilizing approximately 7-8  Continue IV Protonix drip  Serial hemoglobin  N.p.o.  Plan for EGD later today  Follow-up GI      Acute blood loss anemia (ABLA)  Assessment & Plan    No previous history of anemia initially presented with bowel movements hemoglobin was 6.2  Since received 4 units packed red blood cells now hemoglobin 7.8 no further bloody bowel movements  Continue to monitor hemoglobin  Plan for EGD \later this afternoon      CAD (coronary artery disease)  Assessment & Plan  Status post PCI  Currently without chest pain  Continue to hold aspirin for now due to GI bleeding  Continue statin    Syncope  Assessment & Plan  Likely orthostatic from GI blood loss  Symptoms have since resolved  EKG within normal limits         VTE Pharmacologic Prophylaxis:   Pharmacologic: Pharmacologic VTE Prophylaxis contraindicated due to gi bleeding  Mechanical VTE Prophylaxis in Place: Yes    Patient Centered Rounds: I have performed bedside rounds with nursing staff today.    Discussions with Specialists or Other Care Team Provider: cm, nursing    Education and Discussions with Family / Patient: pt, wife at bedside    Time Spent for Care: 30 minutes.  More than 50% of total time spent on counseling and coordination of care as described above.    Current Length of Stay: 1 day(s)    Current Patient Status: Inpatient   Certification Statement: The patient  will continue to require additional inpatient hospital stay due to see below    Discharge Plan: Pending stability of hemoglobin and EGD.  Anticipate approximately 24 to 48 hours    Code Status: Level 1 - Full Code      Subjective:   Currently denies any acute complaints.  Denies any melena, hematochezia, abdominal pain or any other complaints    Objective:     Vitals:   Temp (24hrs), Av.7 °F (37.1 °C), Min:98 °F (36.7 °C), Max:99.5 °F (37.5 °C)    Temp:  [98 °F (36.7 °C)-99.5 °F (37.5 °C)] 98.2 °F (36.8 °C)  HR:  [78-89] 78  Resp:  [16-18] 16  BP: ()/(52-70) 106/56  SpO2:  [95 %-98 %] 97 %  Body mass index is 26.47 kg/m².     Input and Output Summary (last 24 hours):       Intake/Output Summary (Last 24 hours) at 2023 1010  Last data filed at 2023 0241  Gross per 24 hour   Intake 1043.33 ml   Output 1900 ml   Net -856.67 ml       Physical Exam:     Physical Exam  Constitutional:       General: He is not in acute distress.     Appearance: He is well-developed. He is not diaphoretic.   HENT:      Head: Normocephalic and atraumatic.      Nose: Nose normal.      Mouth/Throat:      Pharynx: No oropharyngeal exudate.   Eyes:      General: No scleral icterus.     Conjunctiva/sclera: Conjunctivae normal.   Cardiovascular:      Rate and Rhythm: Normal rate and regular rhythm.      Heart sounds: Normal heart sounds. No murmur heard.     No friction rub. No gallop.   Pulmonary:      Effort: Pulmonary effort is normal. No respiratory distress.      Breath sounds: Normal breath sounds. No wheezing or rales.   Chest:      Chest wall: No tenderness.   Abdominal:      General: Bowel sounds are normal. There is no distension.      Palpations: Abdomen is soft.      Tenderness: There is no abdominal tenderness. There is no guarding.   Musculoskeletal:         General: No tenderness or deformity. Normal range of motion.      Cervical back: Normal range of motion and neck supple.   Skin:     General: Skin is warm  and dry.      Findings: No erythema.   Neurological:      Mental Status: He is alert. Mental status is at baseline.       (     Additional Data:     Labs:    Results from last 7 days   Lab Units 12/26/23  0435   WBC Thousand/uL 8.96   HEMOGLOBIN g/dL 7.8*   HEMATOCRIT % 23.1*   PLATELETS Thousands/uL 120*   NEUTROS PCT % 60   LYMPHS PCT % 30   MONOS PCT % 8   EOS PCT % 1     Results from last 7 days   Lab Units 12/26/23  0435   SODIUM mmol/L 138   POTASSIUM mmol/L 3.8   CHLORIDE mmol/L 110*   CO2 mmol/L 26   BUN mg/dL 39*   CREATININE mg/dL 0.91   ANION GAP mmol/L 2   CALCIUM mg/dL 7.7*   ALBUMIN g/dL 2.9*   TOTAL BILIRUBIN mg/dL 0.45   ALK PHOS U/L 34   ALT U/L 11   AST U/L 10*   GLUCOSE RANDOM mg/dL 108         Results from last 7 days   Lab Units 12/26/23  0656   POC GLUCOSE mg/dl 117                   * I Have Reviewed All Lab Data Listed Above.  * Additional Pertinent Lab Tests Reviewed: All Labs Within Last 24 Hours Reviewed    Imaging:    Imaging Reports Reviewed Today Include: na  Imaging Personally Reviewed by Myself Includes:  na    Recent Cultures (last 7 days):           Last 24 Hours Medication List:   Current Facility-Administered Medications   Medication Dose Route Frequency Provider Last Rate    acetaminophen  650 mg Oral Q6H PRN Jamaal Watts PA-C      atorvastatin  20 mg Oral Daily With Dinner Skyla Allen PA-C      multi-electrolyte  75 mL/hr Intravenous Continuous Hai Pan MD 75 mL/hr (12/26/23 0244)    pantoprazole (PROTONIX) 80 mg in sodium chloride 0.9 % 100 mL infusion  8 mg/hr Intravenous Continuous Hai Pan MD 8 mg/hr (12/25/23 2974)        Today, Patient Was Seen By: Timur Wright MD    ** Please Note: Dictation voice to text software may have been used in the creation of this document. **

## 2023-12-26 NOTE — ANESTHESIA POSTPROCEDURE EVALUATION
Post-Op Assessment Note    CV Status:  Stable    Pain management: adequate       Mental Status:  Sleepy   Hydration Status:  Euvolemic   PONV Controlled:  Controlled   Airway Patency:  Patent  Two or more mitigation strategies used for obstructive sleep apnea   Post Op Vitals Reviewed: Yes      Staff: Anesthesiologist, CRNA               BP   111/52   Temp 97.7   Pulse 66   Resp 16   SpO2 97

## 2023-12-26 NOTE — ANESTHESIA PREPROCEDURE EVALUATION
Procedure:  EGD    Relevant Problems   ANESTHESIA (within normal limits)      CARDIO   (+) CAD (coronary artery disease)      GI/HEPATIC   (+) GI bleed      HEMATOLOGY   (+) Acute blood loss anemia (ABLA)      MUSCULOSKELETAL  L hip osteoarthritis      PULMONARY   (+) Chronic obstructive pulmonary disease (HCC)     Mild COPD and pulm fibrosis noted on recent CT  Physical Exam    Airway    Mallampati score: II         Dental   No notable dental hx     Cardiovascular  Cardiovascular exam normal    Pulmonary  Pulmonary exam normal     Other Findings        Anesthesia Plan  ASA Score- 3     Anesthesia Type- IV sedation with anesthesia with ASA Monitors.         Additional Monitors:     Airway Plan:            Plan Factors-Exercise tolerance (METS): >4 METS.    Chart reviewed. EKG reviewed.  Existing labs reviewed. Patient summary reviewed.                  Induction- intravenous.    Postoperative Plan-     Informed Consent- Anesthetic plan and risks discussed with patient.  I personally reviewed this patient with the CRNA. Discussed and agreed on the Anesthesia Plan with the CRNA..

## 2023-12-26 NOTE — PLAN OF CARE

## 2023-12-27 ENCOUNTER — ANESTHESIA EVENT (INPATIENT)
Dept: GASTROENTEROLOGY | Facility: HOSPITAL | Age: 68
DRG: 378 | End: 2023-12-27
Payer: MEDICARE

## 2023-12-27 ENCOUNTER — APPOINTMENT (INPATIENT)
Dept: CT IMAGING | Facility: HOSPITAL | Age: 68
DRG: 378 | End: 2023-12-27
Payer: MEDICARE

## 2023-12-27 ENCOUNTER — ANESTHESIA (INPATIENT)
Dept: GASTROENTEROLOGY | Facility: HOSPITAL | Age: 68
DRG: 378 | End: 2023-12-27
Payer: MEDICARE

## 2023-12-27 ENCOUNTER — APPOINTMENT (INPATIENT)
Dept: GASTROENTEROLOGY | Facility: HOSPITAL | Age: 68
DRG: 378 | End: 2023-12-27
Attending: INTERNAL MEDICINE
Payer: MEDICARE

## 2023-12-27 LAB
ANION GAP SERPL CALCULATED.3IONS-SCNC: 5 MMOL/L
APTT PPP: 27 SECONDS (ref 23–37)
BUN SERPL-MCNC: 29 MG/DL (ref 5–25)
CALCIUM SERPL-MCNC: 7.7 MG/DL (ref 8.4–10.2)
CHLORIDE SERPL-SCNC: 106 MMOL/L (ref 96–108)
CO2 SERPL-SCNC: 25 MMOL/L (ref 21–32)
CREAT SERPL-MCNC: 0.85 MG/DL (ref 0.6–1.3)
ERYTHROCYTE [DISTWIDTH] IN BLOOD BY AUTOMATED COUNT: 13.7 % (ref 11.6–15.1)
ERYTHROCYTE [DISTWIDTH] IN BLOOD BY AUTOMATED COUNT: 13.8 % (ref 11.6–15.1)
GFR SERPL CREATININE-BSD FRML MDRD: 89 ML/MIN/1.73SQ M
GLUCOSE SERPL-MCNC: 122 MG/DL (ref 65–140)
GLUCOSE SERPL-MCNC: 136 MG/DL (ref 65–140)
HCT VFR BLD AUTO: 18.7 % (ref 36.5–49.3)
HCT VFR BLD AUTO: 18.7 % (ref 36.5–49.3)
HCT VFR BLD AUTO: 20.5 % (ref 36.5–49.3)
HCT VFR BLD AUTO: 20.7 % (ref 36.5–49.3)
HGB BLD-MCNC: 6.3 G/DL (ref 12–17)
HGB BLD-MCNC: 6.4 G/DL (ref 12–17)
HGB BLD-MCNC: 7 G/DL (ref 12–17)
HGB BLD-MCNC: 7.1 G/DL (ref 12–17)
INR PPP: 1.22 (ref 0.84–1.19)
MCH RBC QN AUTO: 31.3 PG (ref 26.8–34.3)
MCH RBC QN AUTO: 31.8 PG (ref 26.8–34.3)
MCHC RBC AUTO-ENTMCNC: 34.1 G/DL (ref 31.4–37.4)
MCHC RBC AUTO-ENTMCNC: 34.2 G/DL (ref 31.4–37.4)
MCV RBC AUTO: 92 FL (ref 82–98)
MCV RBC AUTO: 93 FL (ref 82–98)
PLATELET # BLD AUTO: 125 THOUSANDS/UL (ref 149–390)
PLATELET # BLD AUTO: 128 THOUSANDS/UL (ref 149–390)
PMV BLD AUTO: 10.8 FL (ref 8.9–12.7)
PMV BLD AUTO: 11 FL (ref 8.9–12.7)
POTASSIUM SERPL-SCNC: 3.6 MMOL/L (ref 3.5–5.3)
PROTHROMBIN TIME: 16.1 SECONDS (ref 11.6–14.5)
RBC # BLD AUTO: 2.01 MILLION/UL (ref 3.88–5.62)
RBC # BLD AUTO: 2.24 MILLION/UL (ref 3.88–5.62)
SODIUM SERPL-SCNC: 136 MMOL/L (ref 135–147)
WBC # BLD AUTO: 10.85 THOUSAND/UL (ref 4.31–10.16)
WBC # BLD AUTO: 11.09 THOUSAND/UL (ref 4.31–10.16)

## 2023-12-27 PROCEDURE — C9113 INJ PANTOPRAZOLE SODIUM, VIA: HCPCS | Performed by: INTERNAL MEDICINE

## 2023-12-27 PROCEDURE — 85014 HEMATOCRIT: CPT | Performed by: INTERNAL MEDICINE

## 2023-12-27 PROCEDURE — 85018 HEMOGLOBIN: CPT | Performed by: INTERNAL MEDICINE

## 2023-12-27 PROCEDURE — 99232 SBSQ HOSP IP/OBS MODERATE 35: CPT | Performed by: INTERNAL MEDICINE

## 2023-12-27 PROCEDURE — 85730 THROMBOPLASTIN TIME PARTIAL: CPT

## 2023-12-27 PROCEDURE — 85610 PROTHROMBIN TIME: CPT

## 2023-12-27 PROCEDURE — P9016 RBC LEUKOCYTES REDUCED: HCPCS

## 2023-12-27 PROCEDURE — 80048 BASIC METABOLIC PNL TOTAL CA: CPT | Performed by: INTERNAL MEDICINE

## 2023-12-27 PROCEDURE — 45378 DIAGNOSTIC COLONOSCOPY: CPT | Performed by: INTERNAL MEDICINE

## 2023-12-27 PROCEDURE — NC001 PR NO CHARGE

## 2023-12-27 PROCEDURE — 82948 REAGENT STRIP/BLOOD GLUCOSE: CPT

## 2023-12-27 PROCEDURE — 85027 COMPLETE CBC AUTOMATED: CPT | Performed by: INTERNAL MEDICINE

## 2023-12-27 PROCEDURE — 70450 CT HEAD/BRAIN W/O DYE: CPT

## 2023-12-27 PROCEDURE — G1004 CDSM NDSC: HCPCS

## 2023-12-27 PROCEDURE — 83010 ASSAY OF HAPTOGLOBIN QUANT: CPT | Performed by: INTERNAL MEDICINE

## 2023-12-27 PROCEDURE — 0DJD8ZZ INSPECTION OF LOWER INTESTINAL TRACT, VIA NATURAL OR ARTIFICIAL OPENING ENDOSCOPIC: ICD-10-PCS | Performed by: INTERNAL MEDICINE

## 2023-12-27 RX ORDER — LIDOCAINE HYDROCHLORIDE 20 MG/ML
INJECTION, SOLUTION EPIDURAL; INFILTRATION; INTRACAUDAL; PERINEURAL AS NEEDED
Status: DISCONTINUED | OUTPATIENT
Start: 2023-12-27 | End: 2023-12-27

## 2023-12-27 RX ORDER — PHENYLEPHRINE HCL IN 0.9% NACL 1 MG/10 ML
SYRINGE (ML) INTRAVENOUS AS NEEDED
Status: DISCONTINUED | OUTPATIENT
Start: 2023-12-27 | End: 2023-12-27

## 2023-12-27 RX ORDER — SODIUM CHLORIDE, SODIUM LACTATE, POTASSIUM CHLORIDE, CALCIUM CHLORIDE 600; 310; 30; 20 MG/100ML; MG/100ML; MG/100ML; MG/100ML
INJECTION, SOLUTION INTRAVENOUS CONTINUOUS PRN
Status: DISCONTINUED | OUTPATIENT
Start: 2023-12-27 | End: 2023-12-27

## 2023-12-27 RX ORDER — KETAMINE HCL IN NACL, ISO-OSM 100MG/10ML
SYRINGE (ML) INJECTION AS NEEDED
Status: DISCONTINUED | OUTPATIENT
Start: 2023-12-27 | End: 2023-12-27

## 2023-12-27 RX ORDER — PROPOFOL 10 MG/ML
INJECTION, EMULSION INTRAVENOUS AS NEEDED
Status: DISCONTINUED | OUTPATIENT
Start: 2023-12-27 | End: 2023-12-27

## 2023-12-27 RX ORDER — METOCLOPRAMIDE HYDROCHLORIDE 5 MG/ML
10 INJECTION INTRAMUSCULAR; INTRAVENOUS 3 TIMES DAILY
Status: DISCONTINUED | OUTPATIENT
Start: 2023-12-27 | End: 2023-12-29

## 2023-12-27 RX ORDER — MAGNESIUM CARB/ALUMINUM HYDROX 105-160MG
296 TABLET,CHEWABLE ORAL ONCE
Status: COMPLETED | OUTPATIENT
Start: 2023-12-27 | End: 2023-12-27

## 2023-12-27 RX ORDER — MAGNESIUM CARB/ALUMINUM HYDROX 105-160MG
296 TABLET,CHEWABLE ORAL ONCE
Status: DISCONTINUED | OUTPATIENT
Start: 2023-12-27 | End: 2023-12-27

## 2023-12-27 RX ADMIN — METOCLOPRAMIDE 10 MG: 5 INJECTION, SOLUTION INTRAMUSCULAR; INTRAVENOUS at 16:31

## 2023-12-27 RX ADMIN — PROPOFOL 20 MG: 10 INJECTION, EMULSION INTRAVENOUS at 14:25

## 2023-12-27 RX ADMIN — ACETAMINOPHEN 650 MG: 325 TABLET, FILM COATED ORAL at 21:41

## 2023-12-27 RX ADMIN — Medication 40 MG: at 14:06

## 2023-12-27 RX ADMIN — Medication 100 MCG: at 13:51

## 2023-12-27 RX ADMIN — PROPOFOL 20 MG: 10 INJECTION, EMULSION INTRAVENOUS at 14:19

## 2023-12-27 RX ADMIN — Medication 100 MCG: at 13:43

## 2023-12-27 RX ADMIN — Medication 100 MCG: at 14:16

## 2023-12-27 RX ADMIN — PROPOFOL 20 MG: 10 INJECTION, EMULSION INTRAVENOUS at 14:12

## 2023-12-27 RX ADMIN — Medication 100 MCG: at 14:21

## 2023-12-27 RX ADMIN — PROPOFOL 20 MG: 10 INJECTION, EMULSION INTRAVENOUS at 13:55

## 2023-12-27 RX ADMIN — Medication 40 MG: at 14:20

## 2023-12-27 RX ADMIN — PANTOPRAZOLE SODIUM 40 MG: 40 INJECTION, POWDER, FOR SOLUTION INTRAVENOUS at 03:29

## 2023-12-27 RX ADMIN — PROPOFOL 100 MG: 10 INJECTION, EMULSION INTRAVENOUS at 13:43

## 2023-12-27 RX ADMIN — METOCLOPRAMIDE 10 MG: 5 INJECTION, SOLUTION INTRAMUSCULAR; INTRAVENOUS at 20:45

## 2023-12-27 RX ADMIN — Medication 100 MCG: at 14:27

## 2023-12-27 RX ADMIN — ACETAMINOPHEN 650 MG: 325 TABLET, FILM COATED ORAL at 03:30

## 2023-12-27 RX ADMIN — Medication 20 MG: at 14:01

## 2023-12-27 RX ADMIN — SODIUM CHLORIDE, SODIUM LACTATE, POTASSIUM CHLORIDE, AND CALCIUM CHLORIDE: .6; .31; .03; .02 INJECTION, SOLUTION INTRAVENOUS at 12:59

## 2023-12-27 RX ADMIN — Medication 100 MCG: at 14:25

## 2023-12-27 RX ADMIN — Medication 100 MCG: at 14:08

## 2023-12-27 RX ADMIN — ATORVASTATIN CALCIUM 20 MG: 20 TABLET, FILM COATED ORAL at 16:20

## 2023-12-27 RX ADMIN — SODIUM CHLORIDE, SODIUM LACTATE, POTASSIUM CHLORIDE, AND CALCIUM CHLORIDE: .6; .31; .03; .02 INJECTION, SOLUTION INTRAVENOUS at 14:15

## 2023-12-27 RX ADMIN — PANTOPRAZOLE SODIUM 40 MG: 40 INJECTION, POWDER, FOR SOLUTION INTRAVENOUS at 16:31

## 2023-12-27 RX ADMIN — Medication 100 MCG: at 13:56

## 2023-12-27 RX ADMIN — LIDOCAINE HYDROCHLORIDE 100 MG: 20 INJECTION, SOLUTION EPIDURAL; INFILTRATION; INTRACAUDAL at 13:43

## 2023-12-27 RX ADMIN — Medication 100 MCG: at 13:45

## 2023-12-27 RX ADMIN — PROPOFOL 20 MG: 10 INJECTION, EMULSION INTRAVENOUS at 13:47

## 2023-12-27 RX ADMIN — PROPOFOL 20 MG: 10 INJECTION, EMULSION INTRAVENOUS at 14:08

## 2023-12-27 RX ADMIN — PROPOFOL 20 MG: 10 INJECTION, EMULSION INTRAVENOUS at 14:21

## 2023-12-27 RX ADMIN — IRON SUCROSE 300 MG: 20 INJECTION, SOLUTION INTRAVENOUS at 09:23

## 2023-12-27 RX ADMIN — SODIUM CHLORIDE, SODIUM GLUCONATE, SODIUM ACETATE, POTASSIUM CHLORIDE, MAGNESIUM CHLORIDE, SODIUM PHOSPHATE, DIBASIC, AND POTASSIUM PHOSPHATE 75 ML/HR: .53; .5; .37; .037; .03; .012; .00082 INJECTION, SOLUTION INTRAVENOUS at 16:33

## 2023-12-27 RX ADMIN — PROPOFOL 20 MG: 10 INJECTION, EMULSION INTRAVENOUS at 13:50

## 2023-12-27 RX ADMIN — PROPOFOL 20 MG: 10 INJECTION, EMULSION INTRAVENOUS at 13:58

## 2023-12-27 RX ADMIN — BE HEALTH MAGNESIUM CITRATE ORAL SOLUTION - LEMON 296 ML: 1.75 LIQUID ORAL at 21:25

## 2023-12-27 NOTE — ASSESSMENT & PLAN NOTE
No previous history of anemia initially presented with bowel movements hemoglobin was 6.2  Has since received 4 units packed red blood cells  Hemoglobin now 7.1  Underwent EGD yesterday which revealed multiple duodenal bulb and duodenal sweep angiectasias with active oozing status post APC and epi   Continue IV PPI  Plan for colonoscopy today  N.p.o.  Follow-up further GI recommendations  Continue serial hemoglobin

## 2023-12-27 NOTE — ANESTHESIA POSTPROCEDURE EVALUATION
Post-Op Assessment Note    CV Status:  Stable  Pain Score: 0    Pain management: adequate       Mental Status:  Awake and sleepy   Hydration Status:  Euvolemic   PONV Controlled:  Controlled   Airway Patency:  Patent     Post Op Vitals Reviewed: Yes      Staff: CRNA               BP (!) 89/55 (12/27/23 1430)    Temp 97.6 °F (36.4 °C) (12/27/23 1430)    Pulse 70 (12/27/23 1430)   Resp 18 (12/27/23 1430)    SpO2 100 % (12/27/23 1430)

## 2023-12-27 NOTE — ASSESSMENT & PLAN NOTE
Likely orthostatic from GI blood loss  Symptoms of since resolved  EKG without any acute abnormalities

## 2023-12-27 NOTE — CASE MANAGEMENT
Case Management Progress Note    Patient name Ted Maynard  Location /-02 MRN [id number]  : 1955 Date 2023       LOS (days): 2  Geometric Mean LOS (GMLOS) (days):   Days to GMLOS:        OBJECTIVE:        Current admission status: Inpatient  Preferred Pharmacy:   [id number] - [facility] [address]  [address] [address]  Phone: [phone] Fax: [phone]    Primary Care Provider: No primary care provider on file.    Primary Insurance: MEDICARE  Secondary Insurance: BLUE CROSS    PROGRESS NOTE:  CM called back into room due to patient and wife having additional questions. RYLEY CAMACHO Kristina at bedside.  Concerns about hospitalization being covered under medicare, and what the protocol and coverage was if they chose to have him transferred to [facility]. Discussed that a voluntary transfer to an equivalent hospital would be a private pay transfer. Discussed plan of care again, and parameters for discharge home. Patient and spouse verbalized understanding.

## 2023-12-27 NOTE — PROGRESS NOTES
[facility]  Progress Note  Name: Ted Maynard I  MRN: [id number]  Unit/Bed#: -02 I Date of Admission: 12/25/2023   Date of Service: 12/27/2023 I Hospital Day: 2    Assessment/Plan   * GI bleed  Assessment & Plan  Patient initially presented to the ED after a syncopal episode earlier at home noted increasing tachycardia and lightheadedness when ambulating noted dark tarry stools as well since Friday  Hemoglobin noted to be 6.2 on arrival appears to be normal at baseline  Has since received 4 units total packed red blood cells  Hemoglobin approximately 7  Underwent EGD yesterday which revealed active duodenal bleeding status post APC and epi  Planning for colonoscopy today  Continue serial hemoglobin  N.p.o.  Follow-up further GI recommendations    Acute blood loss anemia (ABLA)  Assessment & Plan    No previous history of anemia initially presented with bowel movements hemoglobin was 6.2  Has since received 4 units packed red blood cells  Hemoglobin now 7.1  Underwent EGD yesterday which revealed multiple duodenal bulb and duodenal sweep angiectasias with active oozing status post APC and epi   Continue IV PPI  Plan for colonoscopy today  N.p.o.  Follow-up further GI recommendations  Continue serial hemoglobin    CAD (coronary artery disease)  Assessment & Plan  Status post PCI  Currently without chest pain  Continue to hold aspirin in setting of GI bleeding    Syncope  Assessment & Plan  Likely orthostatic from GI blood loss  Symptoms of since resolved  EKG without any acute abnormalities         VTE Pharmacologic Prophylaxis:   Pharmacologic: Pharmacologic VTE Prophylaxis contraindicated due to gi bleeding  Mechanical VTE Prophylaxis in Place: Yes    Patient Centered Rounds: I have performed bedside rounds with nursing staff today.    Discussions with Specialists or Other Care Team Provider: cm, nursing    Education and Discussions with Family / Patient: pt    Time Spent for Care:  30 minutes.  More than 50% of total time spent on counseling and coordination of care as described above.    Current Length of Stay: 2 day(s)    Current Patient Status: Inpatient   Certification Statement: The patient will continue to require additional inpatient hospital stay due to see below    Discharge Plan: Pending further evaluation of GI bleeding and stability of hemoglobin.  = Anticipate at least 48 hours    Code Status: Level 1 - Full Code      Subjective:   Denies chest pain, shortness of breath, cough, fevers, chills, denies hematochezia this morning    Objective:     Vitals:   Temp (24hrs), Av.9 °F (36.6 °C), Min:97.4 °F (36.3 °C), Max:98.7 °F (37.1 °C)    Temp:  [97.4 °F (36.3 °C)-98.7 °F (37.1 °C)] 98.3 °F (36.8 °C)  HR:  [67-83] 78  Resp:  [12-20] 16  BP: ()/(43-65) 99/54  SpO2:  [93 %-100 %] 97 %  Body mass index is 26.47 kg/m².     Input and Output Summary (last 24 hours):       Intake/Output Summary (Last 24 hours) at 2023 1100  Last data filed at 2023 0553  Gross per 24 hour   Intake 650 ml   Output 700 ml   Net -50 ml       Physical Exam:     Physical Exam  Constitutional:       General: He is not in acute distress.     Appearance: He is well-developed. He is not diaphoretic.   HENT:      Head: Normocephalic and atraumatic.      Nose: Nose normal.      Mouth/Throat:      Pharynx: No oropharyngeal exudate.   Eyes:      General: No scleral icterus.     Conjunctiva/sclera: Conjunctivae normal.   Cardiovascular:      Rate and Rhythm: Normal rate and regular rhythm.      Heart sounds: Normal heart sounds. No murmur heard.     No friction rub. No gallop.   Pulmonary:      Effort: Pulmonary effort is normal. No respiratory distress.      Breath sounds: Normal breath sounds. No wheezing or rales.   Chest:      Chest wall: No tenderness.   Abdominal:      General: Bowel sounds are normal. There is no distension.      Palpations: Abdomen is soft.      Tenderness: There is no abdominal  tenderness. There is no guarding.   Musculoskeletal:         General: No tenderness or deformity. Normal range of motion.      Cervical back: Normal range of motion and neck supple.   Skin:     General: Skin is warm and dry.      Findings: No erythema.   Neurological:      Mental Status: He is alert. Mental status is at baseline.           Additional Data:     Labs:    Results from last 7 days   Lab Units 12/27/23  0814 12/27/23  0246 12/26/23  1004 12/26/23  0435   WBC Thousand/uL  --  11.09*   < > 8.96   HEMOGLOBIN g/dL 7.1* 6.4*   < > 7.8*   HEMATOCRIT % 20.7* 18.7*   < > 23.1*   PLATELETS Thousands/uL  --  128*   < > 120*   NEUTROS PCT %  --   --   --  60   LYMPHS PCT %  --   --   --  30   MONOS PCT %  --   --   --  8   EOS PCT %  --   --   --  1    < > = values in this interval not displayed.     Results from last 7 days   Lab Units 12/27/23  0246 12/26/23  0435   SODIUM mmol/L 136 138   POTASSIUM mmol/L 3.6 3.8   CHLORIDE mmol/L 106 110*   CO2 mmol/L 25 26   BUN mg/dL 29* 39*   CREATININE mg/dL 0.85 0.91   ANION GAP mmol/L 5 2   CALCIUM mg/dL 7.7* 7.7*   ALBUMIN g/dL  --  2.9*   TOTAL BILIRUBIN mg/dL  --  0.45   ALK PHOS U/L  --  34   ALT U/L  --  11   AST U/L  --  10*   GLUCOSE RANDOM mg/dL 122 108     Results from last 7 days   Lab Units 12/27/23  0246   INR  1.22*     Results from last 7 days   Lab Units 12/26/23  0656   POC GLUCOSE mg/dl 117                   * I Have Reviewed All Lab Data Listed Above.  * Additional Pertinent Lab Tests Reviewed: All Labs Within Last 24 Hours Reviewed    Imaging:    Imaging Reports Reviewed Today Include: na  Imaging Personally Reviewed by Myself Includes:  na    Recent Cultures (last 7 days):           Last 24 Hours Medication List:   Current Facility-Administered Medications   Medication Dose Route Frequency Provider Last Rate    acetaminophen  650 mg Oral Q6H PRN Gio Delvalle III, MD      atorvastatin  20 mg Oral Daily With Dinner Gio Delvalle III, MD      iron  sucrose  300 mg Intravenous Daily Gio Delvalle III, MD      multi-electrolyte  75 mL/hr Intravenous Continuous Gio Delvalle III, MD 75 mL/hr (12/26/23 1742)    pantoprazole  40 mg Intravenous Q12H Gio Delvalle III, MD          Today, Patient Was Seen By: Timur Wright MD    ** Please Note: Dictation voice to text software may have been used in the creation of this document. **

## 2023-12-27 NOTE — CASE MANAGEMENT
Case Management Assessment & Discharge Planning Note    Patient name Ted Maynard  Location /-02 MRN [id number]  : 1955 Date 2023       Current Admission Date: 2023  Current Admission Diagnosis:GI bleed   Patient Active Problem List    Diagnosis Date Noted    Chronic obstructive pulmonary disease (HCC) 2023    GI bleed 2023    Syncope 2023    CAD (coronary artery disease) 2023    Acute blood loss anemia (ABLA) 2023    Abnormal CT of the chest 2019      LOS (days): 2  Geometric Mean LOS (GMLOS) (days):   Days to GMLOS:     OBJECTIVE:    Risk of Unplanned Readmission Score: 12.85         Current admission status: Inpatient       Preferred Pharmacy:   [id number] - [place], [place] - [address]  [address] [address]  Phone: [phone] Fax: [phone]    Primary Care Provider: No primary care provider on file.    Primary Insurance: MEDICARE  Secondary Insurance: BLUE CROSS    ASSESSMENT:  Active Health Care Proxies    There are no active Health Care Proxies on file.                      Patient Information  Admitted from:: Home  Mental Status: Alert  During Assessment patient was accompanied by: Not accompanied during assessment  Assessment information provided by:: Patient  Primary Caregiver: Self  Support Systems: Spouse/significant other  County of Residence: Other (specify in comment box)  What city do you live in[place] [facility]  Home entry access options. Select all that apply.: Stairs  Number of steps to enter home.: 10  Do the steps have railings?: Yes  Type of Current Residence: 2 story home  Upon entering residence, is there a bedroom on the main floor (no further steps)?: Yes  Upon entering residence, is there a bathroom on the main floor (no further steps)?: Yes  Living Arrangements: Lives w/ Spouse/significant other  Is patient a ?: No    Activities of Daily  Living Prior to Admission  Functional Status: Independent  Completes ADLs independently?: Yes  Ambulates independently?: Yes  Does patient use assisted devices?: No  Does patient currently own DME?: No  Does patient have a history of Outpatient Therapy (PT/OT)?: No  Does the patient have a history of Short-Term Rehab?: No  Does patient have a history of HHC?: No  Does patient currently have HHC?: No         Patient Information Continued  Income Source: Pension/FCI  Does patient have prescription coverage?: Yes  Does patient receive dialysis treatments?: No  Does patient have a history of substance abuse?: No  Does patient have a history of Mental Health Diagnosis?: No    PHQ 2/9 Screening   Reviewed PHQ 2/9 Depression Screening Score?: No    Means of Transportation  Means of Transport to Appts:: Drives Self      Housing Stability: Low Risk  (12/27/2023)    Housing Stability Vital Sign     Unable to Pay for Housing in the Last Year: No     Number of Places Lived in the Last Year: 1     Unstable Housing in the Last Year: No   Food Insecurity: No Food Insecurity (12/27/2023)    Hunger Vital Sign     Worried About Running Out of Food in the Last Year: Never true     Ran Out of Food in the Last Year: Never true   Transportation Needs: No Transportation Needs (12/27/2023)    PRAPARE - Transportation     Lack of Transportation (Medical): No     Lack of Transportation (Non-Medical): No   Utilities: Not At Risk (12/27/2023)    [facility] Utilities     Threatened with loss of utilities: No       DISCHARGE DETAILS:    Discharge planning discussed with:: Patient at the bedside  Freedom of Choice: Yes  Comments - Freedom of Choice: FOC maintained in discussion regarding discharge planning. Patient is alert oriented and competent to make decisions. Introduced self and role, explained role of CM in arranging services such as DME, STR, HHC, and providing community resource information. Discussed current living situation and needs  at discharge. Patient is IPTA. CM discussed limitations of offering HHC, STR, DME, PCP, OP CM, community resources [place], patient verbalized understanding. Very concerned about being discharged and having a continued issue. SLIM provider at bedside, discussed concerns and parameters for DC, patient to go for colonoscopy today, and on PPI therapy x 48 hours. Patient described no CM needs at this time.  CM contacted family/caregiver?: No- see comments (patient states wife stepped out and will return shortly)  Were Treatment Team discharge recommendations reviewed with patient/caregiver?: Yes  Did patient/caregiver verbalize understanding of patient care needs?: Yes  Were patient/caregiver advised of the risks associated with not following Treatment Team discharge recommendations?: Yes    Contacts  Patient Contacts: Tanika Maynard (Spouse)  [phone] (Mobile)  Relationship to Patient:: Family  Reason/Outcome: Continuity of Care, Emergency Contact, Discharge Planning    Requested Home Health Care         Is the patient interested in HHC at discharge?: No    DME Referral Provided  Referral made for DME?: No    Other Referral/Resources/Interventions Provided:  Interventions: None Indicated  Referral Comments: Patient describes no CM needs. CM will continue to follow for needs.    Would you like to participate in our [facility] service program?  : No - Declined    Treatment Team Recommendation: Home  Discharge Destination Plan:: Home  Transport at Discharge : Family

## 2023-12-27 NOTE — RESPIRATORY THERAPY NOTE
Called to rapid response. Patient found on floor. Awake and aware. Vital signs stable on room air patient resting without apparent respiratory distress. Nothing further needed from respiratory.

## 2023-12-27 NOTE — ANESTHESIA PREPROCEDURE EVALUATION
Procedure:  COLONOSCOPY    Relevant Problems   CARDIO   (+) CAD (coronary artery disease)      GI/HEPATIC   (+) GI bleed      HEMATOLOGY   (+) Acute blood loss anemia (ABLA)      PULMONARY   (+) Chronic obstructive pulmonary disease (HCC)      Abnormal CT of the chest   Acute blood loss anemia (ABLA)   CAD (coronary artery disease)   Chronic obstructive pulmonary disease (HCC)   GI bleed   Syncope     Physical Exam    Airway    Mallampati score: II  TM Distance: >3 FB  Neck ROM: full     Dental       Cardiovascular  Cardiovascular exam normal    Pulmonary  Pulmonary exam normal     Other Findings        Anesthesia Plan  ASA Score- 2     Anesthesia Type- IV sedation with anesthesia with ASA Monitors.         Additional Monitors:     Airway Plan:            Plan Factors-Exercise tolerance (METS): >4 METS.    Chart reviewed. EKG reviewed. Imaging results reviewed. Existing labs reviewed. Patient summary reviewed.                  Induction- intravenous.    Postoperative Plan-     Informed Consent- Anesthetic plan and risks discussed with patient.  I personally reviewed this patient with the CRNA. Discussed and agreed on the Anesthesia Plan with the CRNA..

## 2023-12-27 NOTE — ASSESSMENT & PLAN NOTE
Patient initially presented to the ED after a syncopal episode earlier at home noted increasing tachycardia and lightheadedness when ambulating noted dark tarry stools as well since Friday  Hemoglobin noted to be 6.2 on arrival appears to be normal at baseline  Has since received 4 units total packed red blood cells  Hemoglobin approximately 7  Underwent EGD yesterday which revealed active duodenal bleeding status post APC and epi  Planning for colonoscopy today  Continue serial hemoglobin  N.p.o.  Follow-up further GI recommendations

## 2023-12-27 NOTE — RAPID RESPONSE
Rapid Response Note  Ted Maynard 68 y.o. male MRN: [id number]  Unit/Bed#: -02 Encounter: [id number]    Rapid Response Notification(s):   Response called date/time:  12/27/2023 2:34 AM  Response team arrival date/time:  12/27/2023 2:35 AM  Response end date/time:  12/27/2023 2:40 AM  [facility]  Rapid response location:  [facility] unit  Primary reason for rapid response call:  Fall (syncopal episode)    Rapid Response Intervention(s):        Assessment:   Syncopal episode with head strike    Plan:   Likely due to orthostatic hypotension, initial BP low now WNL  Neurological exam WNL  STAT CTH wo contrast  Monitor neuro exam Q4h x4  Check AM labs now including CBC  Transfuse for hemoglobin <7  Fall precautions/assistance when OOB  Plan is for colonoscopy today     Rapid Response Outcome:   Transfer:  Remain on floor  Provider notification: SLIM at bedside.    Code Status: Level 1 (Full Code)      Family notified yes  Family member contacted: Spouse at bedside     Background/Situation:   Ted Maynard is a 68 y.o. male who is hospitalized for GIB. He underwent an EGD and is scheduled for colonoscopy today. The patient was ambulating back from the bathroom when he had a syncopal episode leading to a fall. His spouse witnessed the fall and he did have a head strike. Patient's neurological exam is WNL. No cervical tenderness. He does endorse a headache. STAT CTH wo contrast ordered. Will check labs now including hemoglobin and transfuse for Hemoglobin level <7. Monitor neurological exam Q4 hours for 4 occurences. Continue fall precautions. Spouse at the bedside and updated of the above.    Review of Systems   Neurological:  Positive for syncope and headaches. Negative for dizziness, speech difficulty, weakness, light-headedness and numbness.   All other systems reviewed and are negative.      Objective:   Vitals:    12/26/23 1538 12/26/23 2254 12/27/23 0236 12/27/23 0237   BP: 93/55 122/58 (!)  "88/43 130/65   Pulse: 72 75 82 80   Resp: 18      Temp: 98.3 °F (36.8 °C) 98.2 °F (36.8 °C)     TempSrc:       SpO2: 94% 97% 93% 98%   Weight:       Height:         Physical Exam  Vitals reviewed.   HENT:      Head: Normocephalic.      Comments: Small area of superficial bleeding on back of head     Mouth/Throat:      Mouth: Mucous membranes are moist.   Eyes:      Pupils: Pupils are equal, round, and reactive to light.   Cardiovascular:      Pulses: Normal pulses.   Pulmonary:      Effort: Pulmonary effort is normal.   Musculoskeletal:      Cervical back: Normal range of motion and neck supple. No tenderness.   Skin:     General: Skin is warm and dry.      Coloration: Skin is pale.   Neurological:      General: No focal deficit present.      Mental Status: He is alert and oriented to person, place, and time. Mental status is at baseline.      Cranial Nerves: Cranial nerves 2-12 are intact.      Sensory: Sensation is intact.      Motor: Motor function is intact.      Coordination: Coordination is intact.   Psychiatric:         Behavior: Behavior is cooperative.         Portions of the record may have been created with voice recognition software.  Occasional wrong word or \"sound a like\" substitutions may have occurred due to the inherent limitations of voice recognition software.  Read the chart carefully and recognize, using context, where substitutions have occurred.    [place] [employer]      "

## 2023-12-27 NOTE — POST FALL NOTE
"Post Fall Note    No data recorded    Brief Description of Events  Called into room by pt's wife. Pt found on floor next to ed. Pt's wife stated \"he passed out and fell getting back to bed from the bathroom.\" RRT called. Pt BP 88/43, HR 82, O2 93% on room air. Wife also stated pt hit his head. Small amount of blood noted on pillow with wound on back of head. Pt stated he has a headache. Pt assisted to stretcher, head CT and blood work obtained as ordered.    Last Recorded Vitals  Blood pressure 98/53, pulse 71, temperature 98.1 °F (36.7 °C), temperature source Oral, resp. rate 17, height 5' 11\" (1.803 m), weight 86.1 kg (189 lb 13.1 oz), SpO2 99%.      Principal Problem:    GI bleed  Active Problems:    Syncope    CAD (coronary artery disease)    Acute blood loss anemia (ABLA)    Chronic obstructive pulmonary disease (HCC)         "

## 2023-12-27 NOTE — PLAN OF CARE

## 2023-12-28 ENCOUNTER — PROCEDURE VISIT (OUTPATIENT)
Dept: GASTROENTEROLOGY | Facility: CLINIC | Age: 68
End: 2023-12-28
Payer: MEDICARE

## 2023-12-28 ENCOUNTER — TELEPHONE (OUTPATIENT)
Age: 68
End: 2023-12-28

## 2023-12-28 ENCOUNTER — NURSE TRIAGE (OUTPATIENT)
Age: 68
End: 2023-12-28

## 2023-12-28 ENCOUNTER — TELEPHONE (OUTPATIENT)
Dept: GASTROENTEROLOGY | Facility: CLINIC | Age: 68
End: 2023-12-28

## 2023-12-28 DIAGNOSIS — D50.9 IRON DEFICIENCY ANEMIA, UNSPECIFIED IRON DEFICIENCY ANEMIA TYPE: Primary | ICD-10-CM

## 2023-12-28 LAB
ABO GROUP BLD BPU: NORMAL
ANION GAP SERPL CALCULATED.3IONS-SCNC: 2 MMOL/L
BASOPHILS # BLD AUTO: 0.03 THOUSANDS/ÂΜL (ref 0–0.1)
BASOPHILS NFR BLD AUTO: 0 % (ref 0–1)
BPU ID: NORMAL
BUN SERPL-MCNC: 22 MG/DL (ref 5–25)
CALCIUM SERPL-MCNC: 7.4 MG/DL (ref 8.4–10.2)
CHLORIDE SERPL-SCNC: 109 MMOL/L (ref 96–108)
CO2 SERPL-SCNC: 29 MMOL/L (ref 21–32)
CREAT SERPL-MCNC: 0.8 MG/DL (ref 0.6–1.3)
CROSSMATCH: NORMAL
EOSINOPHIL # BLD AUTO: 0.14 THOUSAND/ÂΜL (ref 0–0.61)
EOSINOPHIL NFR BLD AUTO: 2 % (ref 0–6)
ERYTHROCYTE [DISTWIDTH] IN BLOOD BY AUTOMATED COUNT: 15.3 % (ref 11.6–15.1)
GFR SERPL CREATININE-BSD FRML MDRD: 91 ML/MIN/1.73SQ M
GLUCOSE SERPL-MCNC: 115 MG/DL (ref 65–140)
HAPTOGLOB SERPL-MCNC: 79 MG/DL (ref 32–363)
HCT VFR BLD AUTO: 20.8 % (ref 36.5–49.3)
HCT VFR BLD AUTO: 21 % (ref 36.5–49.3)
HCT VFR BLD AUTO: 21.5 % (ref 36.5–49.3)
HCT VFR BLD AUTO: 22.7 % (ref 36.5–49.3)
HGB BLD-MCNC: 7 G/DL (ref 12–17)
HGB BLD-MCNC: 7.1 G/DL (ref 12–17)
HGB BLD-MCNC: 7.3 G/DL (ref 12–17)
HGB BLD-MCNC: 7.8 G/DL (ref 12–17)
IMM GRANULOCYTES # BLD AUTO: 0.06 THOUSAND/UL (ref 0–0.2)
IMM GRANULOCYTES NFR BLD AUTO: 1 % (ref 0–2)
LYMPHOCYTES # BLD AUTO: 2.22 THOUSANDS/ÂΜL (ref 0.6–4.47)
LYMPHOCYTES NFR BLD AUTO: 26 % (ref 14–44)
MCH RBC QN AUTO: 30.9 PG (ref 26.8–34.3)
MCHC RBC AUTO-ENTMCNC: 34 G/DL (ref 31.4–37.4)
MCV RBC AUTO: 91 FL (ref 82–98)
MONOCYTES # BLD AUTO: 0.68 THOUSAND/ÂΜL (ref 0.17–1.22)
MONOCYTES NFR BLD AUTO: 8 % (ref 4–12)
NEUTROPHILS # BLD AUTO: 5.38 THOUSANDS/ÂΜL (ref 1.85–7.62)
NEUTS SEG NFR BLD AUTO: 63 % (ref 43–75)
NRBC BLD AUTO-RTO: 0 /100 WBCS
PLATELET # BLD AUTO: 120 THOUSANDS/UL (ref 149–390)
PMV BLD AUTO: 10.6 FL (ref 8.9–12.7)
POTASSIUM SERPL-SCNC: 3.6 MMOL/L (ref 3.5–5.3)
RBC # BLD AUTO: 2.36 MILLION/UL (ref 3.88–5.62)
SODIUM SERPL-SCNC: 140 MMOL/L (ref 135–147)
UNIT DISPENSE STATUS: NORMAL
UNIT PRODUCT CODE: NORMAL
UNIT PRODUCT VOLUME: 350 ML
UNIT RH: NORMAL
WBC # BLD AUTO: 8.51 THOUSAND/UL (ref 4.31–10.16)

## 2023-12-28 PROCEDURE — 85018 HEMOGLOBIN: CPT | Performed by: INTERNAL MEDICINE

## 2023-12-28 PROCEDURE — 99233 SBSQ HOSP IP/OBS HIGH 50: CPT | Performed by: INTERNAL MEDICINE

## 2023-12-28 PROCEDURE — 88305 TISSUE EXAM BY PATHOLOGIST: CPT | Performed by: PATHOLOGY

## 2023-12-28 PROCEDURE — 0DJ07ZZ INSPECTION OF UPPER INTESTINAL TRACT, VIA NATURAL OR ARTIFICIAL OPENING: ICD-10-PCS | Performed by: INTERNAL MEDICINE

## 2023-12-28 PROCEDURE — 85014 HEMATOCRIT: CPT | Performed by: INTERNAL MEDICINE

## 2023-12-28 PROCEDURE — 80048 BASIC METABOLIC PNL TOTAL CA: CPT | Performed by: INTERNAL MEDICINE

## 2023-12-28 PROCEDURE — C9113 INJ PANTOPRAZOLE SODIUM, VIA: HCPCS | Performed by: INTERNAL MEDICINE

## 2023-12-28 PROCEDURE — 99232 SBSQ HOSP IP/OBS MODERATE 35: CPT | Performed by: INTERNAL MEDICINE

## 2023-12-28 PROCEDURE — 85025 COMPLETE CBC W/AUTO DIFF WBC: CPT | Performed by: INTERNAL MEDICINE

## 2023-12-28 RX ORDER — HYDROXYZINE HYDROCHLORIDE 25 MG/1
25 TABLET, FILM COATED ORAL EVERY 6 HOURS PRN
Status: DISCONTINUED | OUTPATIENT
Start: 2023-12-28 | End: 2023-12-31 | Stop reason: HOSPADM

## 2023-12-28 RX ORDER — DIPHENHYDRAMINE HYDROCHLORIDE 50 MG/ML
25 INJECTION INTRAMUSCULAR; INTRAVENOUS
Status: DISCONTINUED | OUTPATIENT
Start: 2023-12-28 | End: 2023-12-31 | Stop reason: HOSPADM

## 2023-12-28 RX ADMIN — METOCLOPRAMIDE 10 MG: 5 INJECTION, SOLUTION INTRAMUSCULAR; INTRAVENOUS at 09:40

## 2023-12-28 RX ADMIN — METOCLOPRAMIDE 10 MG: 5 INJECTION, SOLUTION INTRAMUSCULAR; INTRAVENOUS at 20:00

## 2023-12-28 RX ADMIN — PANTOPRAZOLE SODIUM 40 MG: 40 INJECTION, POWDER, FOR SOLUTION INTRAVENOUS at 03:57

## 2023-12-28 RX ADMIN — DIPHENHYDRAMINE HYDROCHLORIDE 25 MG: 50 INJECTION, SOLUTION INTRAMUSCULAR; INTRAVENOUS at 21:19

## 2023-12-28 RX ADMIN — ATORVASTATIN CALCIUM 20 MG: 20 TABLET, FILM COATED ORAL at 16:05

## 2023-12-28 RX ADMIN — HYDROXYZINE HYDROCHLORIDE 25 MG: 25 TABLET, FILM COATED ORAL at 18:30

## 2023-12-28 RX ADMIN — SODIUM CHLORIDE, SODIUM GLUCONATE, SODIUM ACETATE, POTASSIUM CHLORIDE, MAGNESIUM CHLORIDE, SODIUM PHOSPHATE, DIBASIC, AND POTASSIUM PHOSPHATE 75 ML/HR: .53; .5; .37; .037; .03; .012; .00082 INJECTION, SOLUTION INTRAVENOUS at 21:19

## 2023-12-28 RX ADMIN — IRON SUCROSE 300 MG: 20 INJECTION, SOLUTION INTRAVENOUS at 09:41

## 2023-12-28 RX ADMIN — PANTOPRAZOLE SODIUM 40 MG: 40 INJECTION, POWDER, FOR SOLUTION INTRAVENOUS at 16:05

## 2023-12-28 RX ADMIN — DIPHENHYDRAMINE HYDROCHLORIDE 25 MG: 50 INJECTION, SOLUTION INTRAMUSCULAR; INTRAVENOUS at 01:16

## 2023-12-28 RX ADMIN — METOCLOPRAMIDE 10 MG: 5 INJECTION, SOLUTION INTRAMUSCULAR; INTRAVENOUS at 16:05

## 2023-12-28 NOTE — ASSESSMENT & PLAN NOTE
Likely orthostatic from GI blood loss  Symptoms of since resolved  EKG without any signs of acute ischemia

## 2023-12-28 NOTE — TELEPHONE ENCOUNTER
"SPOKE WITH ELIZABETH KING FROM [facility], PT WITH CAPSULE ENDOSCOPY IN PROGRESS, MONITOR IS BLINKING YELLOW. CALLED BOTH [facility] AND [place] [facility] TEAMS FOR TROUBLE SHOOTING MANUAL INFORMATION, PER [place] [facility], SYSTEM CHECK, MONITOR IS WORKING. CALLED ELIZABETH RN TO INFORM, ALSO ADVISED TO CHECK ALL CONNECTIONS AND MAKE SURE BELT REMAINS SNUG. MONITOR NOW BLINKING GREEN. NO FURTHER ACTION REQUIRED.         Reason for Disposition   Information only question and nurse able to answer    Answer Assessment - Initial Assessment Questions  1. REASON FOR CALL or QUESTION: \"What is your reason for calling today?\" or \"How can I best help you?\" or \"What question do you have that I can help answer?\"      ELIZABETH FROM [facility] CALLING WITH QUESTIONS ABOUT CAPSULE ENDOSCOPY MONITOR BLINKING YELLOW.    Protocols used: Information Only Call - No Triage-ADULT-[place]    "

## 2023-12-28 NOTE — PLAN OF CARE

## 2023-12-28 NOTE — TELEPHONE ENCOUNTER
Chinedu from [place] called in with questions about the capsul endoscopy from the pt. Transferred to the nurses and Patsy took the call

## 2023-12-28 NOTE — PROGRESS NOTES
"Progress Note - Ted Maynard 68 y.o. male MRN: [id number]    Unit/Bed#: -02 Encounter: [id number]    Subjective:     Patient is undergoing VCE today.  He reports 3 black stools.  He denies abdominal pain, nausea or vomiting.     Objective:     Vitals: Blood pressure 108/59, pulse 78, temperature 98.9 °F (37.2 °C), resp. rate 20, height 5' 11\" (1.803 m), weight 86.1 kg (189 lb 13.1 oz), SpO2 94%.,Body mass index is 26.47 kg/m².      Intake/Output Summary (Last 24 hours) at 12/28/2023 1442  Last data filed at 12/28/2023 0809  Gross per 24 hour   Intake 1650 ml   Output --   Net 1650 ml       Physical Exam:     General Appearance: Alert, appears stated age and cooperative  Lungs: Clear to auscultation bilaterally, no rales or rhonchi, no labored breathing/accessory muscle use  Heart: Regular rate and rhythm, S1, S2 normal, no murmur, click, rub or gallop  Abdomen: Soft, non-tender, non-distended; bowel sounds normal; no masses or no organomegaly  Extremities: No cyanosis, edema    Invasive Devices       Peripheral Intravenous Line  Duration             Peripheral IV 12/27/23 Right;Ventral (anterior) Forearm 1 day                    Lab Results:  Results from last 7 days   Lab Units 12/28/23  1222 12/28/23  0500   WBC Thousand/uL  --  8.51   HEMOGLOBIN g/dL 7.0* 7.3*   HEMATOCRIT % 21.0* 21.5*   PLATELETS Thousands/uL  --  120*   NEUTROS PCT %  --  63   LYMPHS PCT %  --  26   MONOS PCT %  --  8   EOS PCT %  --  2     Results from last 7 days   Lab Units 12/28/23  0500 12/27/23  0246 12/26/23  0435   POTASSIUM mmol/L 3.6   < > 3.8   CHLORIDE mmol/L 109*   < > 110*   CO2 mmol/L 29   < > 26   BUN mg/dL 22   < > 39*   CREATININE mg/dL 0.80   < > 0.91   CALCIUM mg/dL 7.4*   < > 7.7*   ALK PHOS U/L  --   --  34   ALT U/L  --   --  11   AST U/L  --   --  10*    < > = values in this interval not displayed.     Results from last 7 days   Lab Units 12/27/23  0246   INR  1.22*           Imaging Studies: I have personally " reviewed pertinent imaging studies.    Colonoscopy    Result Date: 12/27/2023  Impression: Melena throughout the entire colon with intubation of the ileocecal valve with melena in the ileum indicating a small bowel bleed from above; the entire colon was lavaged using 2.5 L of saline and no active bleeding was noted Sigmoid diverticulosis RECOMMENDATION:  Repeat screening colonoscopy in 10 years Check hemoglobin every 8 hours Transfusion of PRBCs per [facility] service Video capsule endoscopy tomorrow  Gio Delvalle III, MD     CT head wo contrast    Result Date: 12/27/2023  Impression: No intracranial hemorrhage or calvarial fracture. Workstation performed: [id number]     EGD    Result Date: 12/26/2023  Impression: 1 cm hiatal hernia Mild erosive gastritis Multiple duodenal bulb and duodenal sweep angioectasias with active oozing of blood status post epinephrine injection and APC cautery with hemostasis achieved RECOMMENDATION:  Await pathology results Clear liquid diet Monitor CBC Colonoscopy tomorrow Video capsule endoscopy as an outpatient Venofer course   Gio Delvalle III, MD       Assessment and Plan:     Melena  Anemia  - Patient presented 12/25 with black stools and syncope  - Hgb on admission 6.2 with improvement to 7.8 after PRBC transfusion and drop to 6.3 with improvement again to 7.8 after PRBC now trending down to 7.0  - Patient reports ongoing black stools x 3 today  - EGD 12/26 with gastritis and multiple small AVMs duodenal bulb s/p APC and epi injection  - Colonoscopy 12/27 with melena throughout the colon  - Undergoing VCE today  - Pending VCE results will plan push enteroscopy tomorrow vs transfer to [place] for SB enteroscopy  - Continue to trend Hgb/Hct and transfuse as necessary to maintain Hgb >6.9  - Continue to avoid blood thinning medications    Patient will be seen by Dr. Delvalle

## 2023-12-28 NOTE — TELEPHONE ENCOUNTER
Minh from [facility] calling back regarding capsule endoscopy concerns. Minh states everything is working fine now. The wiring was lose.

## 2023-12-28 NOTE — PROGRESS NOTES
Received a call from [facility] letting us know that the nurse from the Hospital advised that the pill cam recorder  has a blinking yellow light.    I spoke with Yanick HIDALGO, she advised the pt swallowed the pill successfully and equipment was working as design.

## 2023-12-28 NOTE — ASSESSMENT & PLAN NOTE
No previous history of anemia initially presented with bloody bowel movements hemoglobin was 6.2 on arrival  Hemoglobin approximately 7 after 6 units packed red blood cells  Continue monitor hemoglobin  Transfuse person 7  Plan as above

## 2023-12-28 NOTE — ASSESSMENT & PLAN NOTE
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

## 2023-12-28 NOTE — PROGRESS NOTES
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 status post APC and cauterization on 12/26  Underwent colonoscopy on 12/27 that revealed melena throughout the colon but did not reveal any active site of bleeding  Planning for inpatient capsule endoscopy today  Continue to monitor hemoglobin  Transfuse if less than 7  Follow-up further GI recommendations    Acute blood loss anemia (ABLA)  Assessment & Plan    No previous history of anemia initially presented with bloody bowel movements hemoglobin was 6.2 on arrival  Hemoglobin approximately 7 after 6 units packed red blood cells  Continue monitor hemoglobin  Transfuse person 7  Plan as above    Syncope  Assessment & Plan  Likely orthostatic from GI blood loss  Symptoms of since resolved  EKG without any signs of acute ischemia         VTE Pharmacologic Prophylaxis:   Pharmacologic: Pharmacologic VTE Prophylaxis contraindicated due to GI bleeding  Mechanical VTE Prophylaxis in Place: Yes    Patient Centered Rounds: I have performed bedside rounds with nursing staff today.    Discussions with Specialists or Other Care Team Provider: rajan, rj    Education and Discussions with Family / Patient: pt, wife at bedside    Time Spent for Care: 30 minutes.  More than 50% of total time spent on counseling and coordination of care as described above.    Current Length of Stay: 3 day(s)    Current Patient Status: Inpatient    Certification Statement: The patient will continue to require additional inpatient hospital stay due to see below    Discharge Plan: Pending stability of hemoglobin and further evaluation of GI bleeding.  Anticipate approximately 48 hours    Code Status: Level 1 - Full Code      Subjective:   Reports black stool overnight.  Otherwise denies any acute complaints.  Denies fevers, chills, cough or any other complaints.    Objective:     Vitals:   Temp (24hrs), Av.2 °F (36.8 °C), Min:97.6 °F (36.4 °C), Max:99.5 °F (37.5 °C)    Temp:  [97.6 °F (36.4 °C)-99.5 °F (37.5 °C)] 97.8 °F (36.6 °C)  HR:  [70-88] 73  Resp:  [15-18] 18  BP: ()/(50-60) 113/60  SpO2:  [93 %-100 %] 93 %  Body mass index is 26.47 kg/m².     Input and Output Summary (last 24 hours):       Intake/Output Summary (Last 24 hours) at 2023 1022  Last data filed at 2023 0809  Gross per 24 hour   Intake 2450 ml   Output --   Net 2450 ml       Physical Exam:     Physical Exam  Constitutional:       General: He is not in acute distress.     Appearance: He is well-developed. He is not diaphoretic.   HENT:      Head: Normocephalic and atraumatic.      Nose: Nose normal.      Mouth/Throat:      Pharynx: No oropharyngeal exudate.   Eyes:      General: No scleral icterus.     Conjunctiva/sclera: Conjunctivae normal.   Cardiovascular:      Rate and Rhythm: Normal rate and regular rhythm.      Heart sounds: Normal heart sounds. No murmur heard.     No friction rub. No gallop.   Pulmonary:      Effort: Pulmonary effort is normal. No respiratory distress.      Breath sounds: Normal breath sounds. No wheezing or rales.   Chest:      Chest wall: No tenderness.   Abdominal:      General: Bowel sounds are normal. There is no distension.      Palpations: Abdomen is soft.      Tenderness: There is no abdominal tenderness. There is no guarding.   Musculoskeletal:         General: No tenderness or deformity. Normal range of motion.      Cervical back:  Normal range of motion and neck supple.   Skin:     General: Skin is warm and dry.      Findings: No erythema.   Neurological:      Mental Status: He is alert. Mental status is at baseline.         Additional Data:     Labs:    Results from last 7 days   Lab Units 12/28/23  0500   WBC Thousand/uL 8.51   HEMOGLOBIN g/dL 7.3*   HEMATOCRIT % 21.5*   PLATELETS Thousands/uL 120*   NEUTROS PCT % 63   LYMPHS PCT % 26   MONOS PCT % 8   EOS PCT % 2     Results from last 7 days   Lab Units 12/28/23  0500 12/27/23  0246 12/26/23  0435   SODIUM mmol/L 140   < > 138   POTASSIUM mmol/L 3.6   < > 3.8   CHLORIDE mmol/L 109*   < > 110*   CO2 mmol/L 29   < > 26   BUN mg/dL 22   < > 39*   CREATININE mg/dL 0.80   < > 0.91   ANION GAP mmol/L 2   < > 2   CALCIUM mg/dL 7.4*   < > 7.7*   ALBUMIN g/dL  --   --  2.9*   TOTAL BILIRUBIN mg/dL  --   --  0.45   ALK PHOS U/L  --   --  34   ALT U/L  --   --  11   AST U/L  --   --  10*   GLUCOSE RANDOM mg/dL 115   < > 108    < > = values in this interval not displayed.     Results from last 7 days   Lab Units 12/27/23  0246   INR  1.22*     Results from last 7 days   Lab Units 12/27/23  0235 12/26/23  0656   POC GLUCOSE mg/dl 136 117                   * I Have Reviewed All Lab Data Listed Above.  * Additional Pertinent Lab Tests Reviewed: All Labs Within Last 24 Hours Reviewed    Imaging:    Imaging Reports Reviewed Today Include: na  Imaging Personally Reviewed by Myself Includes:  na    Recent Cultures (last 7 days):           Last 24 Hours Medication List:   Current Facility-Administered Medications   Medication Dose Route Frequency Provider Last Rate    acetaminophen  650 mg Oral Q6H PRN Gio Delvalle III, MD      atorvastatin  20 mg Oral Daily With Dinner Gio Delvalle III, MD      diphenhydrAMINE  25 mg Intravenous HS PRN Brigitte Crockett PA-C      iron sucrose  300 mg Intravenous Daily Gio Delvalle III, MD      metoclopramide  10 mg Intravenous TID Gio Delvalle III, MD       multi-electrolyte  75 mL/hr Intravenous Continuous Gio Delvalle III, MD 75 mL/hr (12/27/23 1633)    pantoprazole  40 mg Intravenous Q12H Gio Delvalle III, MD          Today, Patient Was Seen By: Timur Wright MD    ** Please Note: Dictation voice to text software may have been used in the creation of this document. **

## 2023-12-29 ENCOUNTER — APPOINTMENT (INPATIENT)
Dept: GASTROENTEROLOGY | Facility: HOSPITAL | Age: 68
DRG: 378 | End: 2023-12-29
Attending: INTERNAL MEDICINE
Payer: MEDICARE

## 2023-12-29 ENCOUNTER — ANESTHESIA EVENT (INPATIENT)
Dept: GASTROENTEROLOGY | Facility: HOSPITAL | Age: 68
DRG: 378 | End: 2023-12-29
Payer: MEDICARE

## 2023-12-29 ENCOUNTER — ANESTHESIA (INPATIENT)
Dept: GASTROENTEROLOGY | Facility: HOSPITAL | Age: 68
DRG: 378 | End: 2023-12-29
Payer: MEDICARE

## 2023-12-29 LAB
ABO GROUP BLD BPU: NORMAL
ABO GROUP BLD: NORMAL
ANION GAP SERPL CALCULATED.3IONS-SCNC: 1 MMOL/L
BASOPHILS # BLD AUTO: 0.03 THOUSANDS/ÂΜL (ref 0–0.1)
BASOPHILS NFR BLD AUTO: 0 % (ref 0–1)
BLD GP AB SCN SERPL QL: NEGATIVE
BPU ID: NORMAL
BUN SERPL-MCNC: 17 MG/DL (ref 5–25)
CALCIUM SERPL-MCNC: 7.4 MG/DL (ref 8.4–10.2)
CHLORIDE SERPL-SCNC: 109 MMOL/L (ref 96–108)
CO2 SERPL-SCNC: 28 MMOL/L (ref 21–32)
CREAT SERPL-MCNC: 0.83 MG/DL (ref 0.6–1.3)
CROSSMATCH: NORMAL
EOSINOPHIL # BLD AUTO: 0.24 THOUSAND/ÂΜL (ref 0–0.61)
EOSINOPHIL NFR BLD AUTO: 3 % (ref 0–6)
ERYTHROCYTE [DISTWIDTH] IN BLOOD BY AUTOMATED COUNT: 19.8 % (ref 11.6–15.1)
GFR SERPL CREATININE-BSD FRML MDRD: 90 ML/MIN/1.73SQ M
GLUCOSE SERPL-MCNC: 107 MG/DL (ref 65–140)
GLUCOSE SERPL-MCNC: 107 MG/DL (ref 65–140)
GLUCOSE SERPL-MCNC: 119 MG/DL (ref 65–140)
HCT VFR BLD AUTO: 18.3 % (ref 36.5–49.3)
HCT VFR BLD AUTO: 21.5 % (ref 36.5–49.3)
HCT VFR BLD AUTO: 23.4 % (ref 36.5–49.3)
HCT VFR BLD AUTO: 24.3 % (ref 36.5–49.3)
HGB BLD-MCNC: 6.2 G/DL (ref 12–17)
HGB BLD-MCNC: 6.8 G/DL (ref 12–17)
HGB BLD-MCNC: 7.8 G/DL (ref 12–17)
HGB BLD-MCNC: 8.2 G/DL (ref 12–17)
IMM GRANULOCYTES # BLD AUTO: 0.05 THOUSAND/UL (ref 0–0.2)
IMM GRANULOCYTES NFR BLD AUTO: 1 % (ref 0–2)
LYMPHOCYTES # BLD AUTO: 1.53 THOUSANDS/ÂΜL (ref 0.6–4.47)
LYMPHOCYTES NFR BLD AUTO: 20 % (ref 14–44)
MCH RBC QN AUTO: 30.8 PG (ref 26.8–34.3)
MCHC RBC AUTO-ENTMCNC: 31.6 G/DL (ref 31.4–37.4)
MCV RBC AUTO: 97 FL (ref 82–98)
MONOCYTES # BLD AUTO: 0.61 THOUSAND/ÂΜL (ref 0.17–1.22)
MONOCYTES NFR BLD AUTO: 8 % (ref 4–12)
NEUTROPHILS # BLD AUTO: 5.17 THOUSANDS/ÂΜL (ref 1.85–7.62)
NEUTS SEG NFR BLD AUTO: 68 % (ref 43–75)
NRBC BLD AUTO-RTO: 0 /100 WBCS
PLATELET # BLD AUTO: 136 THOUSANDS/UL (ref 149–390)
PMV BLD AUTO: 10.7 FL (ref 8.9–12.7)
POTASSIUM SERPL-SCNC: 3.8 MMOL/L (ref 3.5–5.3)
RBC # BLD AUTO: 2.21 MILLION/UL (ref 3.88–5.62)
RH BLD: POSITIVE
SODIUM SERPL-SCNC: 138 MMOL/L (ref 135–147)
SPECIMEN EXPIRATION DATE: NORMAL
UNIT DISPENSE STATUS: NORMAL
UNIT PRODUCT CODE: NORMAL
UNIT PRODUCT VOLUME: 300 ML
UNIT RH: NORMAL
WBC # BLD AUTO: 7.63 THOUSAND/UL (ref 4.31–10.16)

## 2023-12-29 PROCEDURE — 80048 BASIC METABOLIC PNL TOTAL CA: CPT | Performed by: INTERNAL MEDICINE

## 2023-12-29 PROCEDURE — 86850 RBC ANTIBODY SCREEN: CPT

## 2023-12-29 PROCEDURE — 85014 HEMATOCRIT: CPT | Performed by: INTERNAL MEDICINE

## 2023-12-29 PROCEDURE — 99232 SBSQ HOSP IP/OBS MODERATE 35: CPT | Performed by: INTERNAL MEDICINE

## 2023-12-29 PROCEDURE — 0W3P8ZZ CONTROL BLEEDING IN GASTROINTESTINAL TRACT, VIA NATURAL OR ARTIFICIAL OPENING ENDOSCOPIC: ICD-10-PCS | Performed by: INTERNAL MEDICINE

## 2023-12-29 PROCEDURE — P9016 RBC LEUKOCYTES REDUCED: HCPCS

## 2023-12-29 PROCEDURE — 86923 COMPATIBILITY TEST ELECTRIC: CPT

## 2023-12-29 PROCEDURE — 85018 HEMOGLOBIN: CPT | Performed by: INTERNAL MEDICINE

## 2023-12-29 PROCEDURE — 44366 SMALL BOWEL ENDOSCOPY: CPT | Performed by: INTERNAL MEDICINE

## 2023-12-29 PROCEDURE — 82948 REAGENT STRIP/BLOOD GLUCOSE: CPT

## 2023-12-29 PROCEDURE — C9113 INJ PANTOPRAZOLE SODIUM, VIA: HCPCS | Performed by: INTERNAL MEDICINE

## 2023-12-29 PROCEDURE — 86900 BLOOD TYPING SEROLOGIC ABO: CPT

## 2023-12-29 PROCEDURE — 85025 COMPLETE CBC W/AUTO DIFF WBC: CPT | Performed by: INTERNAL MEDICINE

## 2023-12-29 PROCEDURE — 86901 BLOOD TYPING SEROLOGIC RH(D): CPT

## 2023-12-29 RX ORDER — PHENYLEPHRINE HCL IN 0.9% NACL 1 MG/10 ML
SYRINGE (ML) INTRAVENOUS AS NEEDED
Status: DISCONTINUED | OUTPATIENT
Start: 2023-12-29 | End: 2023-12-29

## 2023-12-29 RX ORDER — EPHEDRINE SULFATE 50 MG/ML
INJECTION INTRAVENOUS AS NEEDED
Status: DISCONTINUED | OUTPATIENT
Start: 2023-12-29 | End: 2023-12-29

## 2023-12-29 RX ORDER — SODIUM CHLORIDE, SODIUM LACTATE, POTASSIUM CHLORIDE, CALCIUM CHLORIDE 600; 310; 30; 20 MG/100ML; MG/100ML; MG/100ML; MG/100ML
INJECTION, SOLUTION INTRAVENOUS CONTINUOUS PRN
Status: DISCONTINUED | OUTPATIENT
Start: 2023-12-29 | End: 2023-12-29

## 2023-12-29 RX ORDER — LIDOCAINE HYDROCHLORIDE 10 MG/ML
INJECTION, SOLUTION EPIDURAL; INFILTRATION; INTRACAUDAL; PERINEURAL AS NEEDED
Status: DISCONTINUED | OUTPATIENT
Start: 2023-12-29 | End: 2023-12-29

## 2023-12-29 RX ORDER — EPINEPHRINE 1 MG/ML
INJECTION, SOLUTION, CONCENTRATE INTRAVENOUS
Status: DISPENSED
Start: 2023-12-29 | End: 2023-12-30

## 2023-12-29 RX ORDER — PROPOFOL 10 MG/ML
INJECTION, EMULSION INTRAVENOUS AS NEEDED
Status: DISCONTINUED | OUTPATIENT
Start: 2023-12-29 | End: 2023-12-29

## 2023-12-29 RX ADMIN — PANTOPRAZOLE SODIUM 40 MG: 40 INJECTION, POWDER, FOR SOLUTION INTRAVENOUS at 04:30

## 2023-12-29 RX ADMIN — PANTOPRAZOLE SODIUM 40 MG: 40 INJECTION, POWDER, FOR SOLUTION INTRAVENOUS at 17:36

## 2023-12-29 RX ADMIN — PROPOFOL 30 MG: 10 INJECTION, EMULSION INTRAVENOUS at 13:10

## 2023-12-29 RX ADMIN — PROPOFOL 30 MG: 10 INJECTION, EMULSION INTRAVENOUS at 13:16

## 2023-12-29 RX ADMIN — PROPOFOL 30 MG: 10 INJECTION, EMULSION INTRAVENOUS at 13:21

## 2023-12-29 RX ADMIN — PROPOFOL 30 MG: 10 INJECTION, EMULSION INTRAVENOUS at 13:14

## 2023-12-29 RX ADMIN — PROPOFOL 100 MG: 10 INJECTION, EMULSION INTRAVENOUS at 13:07

## 2023-12-29 RX ADMIN — PROPOFOL 30 MG: 10 INJECTION, EMULSION INTRAVENOUS at 13:12

## 2023-12-29 RX ADMIN — EPHEDRINE SULFATE 10 MG: 50 INJECTION, SOLUTION INTRAVENOUS at 13:29

## 2023-12-29 RX ADMIN — PROPOFOL 30 MG: 10 INJECTION, EMULSION INTRAVENOUS at 13:29

## 2023-12-29 RX ADMIN — SODIUM CHLORIDE, SODIUM LACTATE, POTASSIUM CHLORIDE, AND CALCIUM CHLORIDE: .6; .31; .03; .02 INJECTION, SOLUTION INTRAVENOUS at 12:57

## 2023-12-29 RX ADMIN — Medication 200 MCG: at 13:26

## 2023-12-29 RX ADMIN — Medication 200 MCG: at 13:19

## 2023-12-29 RX ADMIN — SODIUM CHLORIDE, SODIUM GLUCONATE, SODIUM ACETATE, POTASSIUM CHLORIDE, MAGNESIUM CHLORIDE, SODIUM PHOSPHATE, DIBASIC, AND POTASSIUM PHOSPHATE 75 ML/HR: .53; .5; .37; .037; .03; .012; .00082 INJECTION, SOLUTION INTRAVENOUS at 15:28

## 2023-12-29 RX ADMIN — PROPOFOL 30 MG: 10 INJECTION, EMULSION INTRAVENOUS at 13:18

## 2023-12-29 RX ADMIN — PROPOFOL 30 MG: 10 INJECTION, EMULSION INTRAVENOUS at 13:24

## 2023-12-29 RX ADMIN — ATORVASTATIN CALCIUM 20 MG: 20 TABLET, FILM COATED ORAL at 17:36

## 2023-12-29 RX ADMIN — LIDOCAINE HYDROCHLORIDE 50 MG: 10 INJECTION, SOLUTION EPIDURAL; INFILTRATION; INTRACAUDAL at 13:07

## 2023-12-29 RX ADMIN — Medication 200 MCG: at 13:13

## 2023-12-29 RX ADMIN — METOCLOPRAMIDE 10 MG: 5 INJECTION, SOLUTION INTRAMUSCULAR; INTRAVENOUS at 08:32

## 2023-12-29 NOTE — ANESTHESIA POSTPROCEDURE EVALUATION
Post-Op Assessment Note    CV Status:  Stable  Pain Score: 0    Pain management: adequate       Mental Status:  Sleepy and arousable   Hydration Status:  Euvolemic   PONV Controlled:  Controlled   Airway Patency:  Patent     Post Op Vitals Reviewed: Yes      Staff: CRNA               BP (!) 81/50 (12/29/23 1339)    Temp 98.3 °F (36.8 °C) (12/29/23 1339)    Pulse 70 (12/29/23 1339)   Resp 20 (12/29/23 1339)    SpO2 96 % (12/29/23 1339)

## 2023-12-29 NOTE — QUICK NOTE
Received critical result. Hgb 6.2. Transfuse 1unit PRBC, continue serial H&H, NPO after midnight for Endoscopy.

## 2023-12-29 NOTE — PLAN OF CARE
Problem: DISCHARGE PLANNING  Goal: Discharge to home or other facility with appropriate resources  Description: INTERVENTIONS:  - Identify barriers to discharge w/patient and caregiver  - Arrange for needed discharge resources and transportation as appropriate  - Identify discharge learning needs (meds, wound care, etc.)  - Arrange for interpretive services to assist at discharge as needed  - Refer to Case Management Department for coordinating discharge planning if the patient needs post-hospital services based on physician/advanced practitioner order or complex needs related to functional status, cognitive ability, or social support system  Outcome: Progressing     Problem: Knowledge Deficit  Goal: Patient/family/caregiver demonstrates understanding of disease process, treatment plan, medications, and discharge instructions  Description: Complete learning assessment and assess knowledge base.  Interventions:  - Provide teaching at level of understanding  - Provide teaching via preferred learning methods  Outcome: Progressing     Problem: HEMATOLOGIC - ADULT  Goal: Maintains hematologic stability  Description: INTERVENTIONS  - Assess for signs and symptoms of bleeding or hemorrhage  - Monitor labs  - Administer supportive blood products/factors as ordered and appropriate  Outcome: Not Progressing

## 2023-12-29 NOTE — CASE MANAGEMENT
Case Management Progress Note    Patient name Ted Maynard  Location /-02 MRN [id number]  : 1955 Date 2023       LOS (days): 4  Geometric Mean LOS (GMLOS) (days): 3  Days to GMLOS:-1.4        OBJECTIVE:        Current admission status: Inpatient  Preferred Pharmacy:   [id number] - [place], [place] - [address]  [address] [address]  Phone: [phone] Fax: [phone]    Primary Care Provider: No primary care provider on file.    Primary Insurance: MEDICARE  Secondary Insurance: BLUE CROSS    PROGRESS NOTE:  Discussed patient in interdisciplinary rounds, will continue to need inpatient stay for continued internal bleeding, need for 2 additional blood transfusions, and push enteroscopy in [place], possibly need transfer to [place].

## 2023-12-29 NOTE — ANESTHESIA PREPROCEDURE EVALUATION
Procedure:  EGD WITH PUSH ENTEROSCOPY    Relevant Problems   CARDIO   (+) CAD (coronary artery disease) s/p PCI 2021      GI/HEPATIC   (+) GI bleed      HEMATOLOGY   (+) Acute blood loss anemia (ABLA)      PULMONARY   (+) Chronic obstructive pulmonary disease (HCC)      Received 1U PRBC for hemoglobin 6.8  Lab Results   Component Value Date    WBC 7.63 12/29/2023    HGB 6.8 (LL) 12/29/2023    HCT 21.5 (L) 12/29/2023    MCV 97 12/29/2023     (L) 12/29/2023       Physical Exam    Airway    Mallampati score: III  TM Distance: >3 FB  Neck ROM: full     Dental   No notable dental hx     Cardiovascular  Rhythm: regular    Pulmonary   Breath sounds clear to auscultation    Other Findings      Interval progression of interstitial markings in the lower lobes bilaterally, suggesting interstitial lung disease/fibrosis     12/2019 echo  1. Left ventricle: The cavity size is normal. The left ventricular ejection fraction is normal. The left ventricular ejection      fraction is 55-60%.   2. Right ventricle: The cavity size is normal. The right ventricular ejection fraction is normal.   3. Mitral valve: The leaflets are normal thickness. There is no evidence of mitral valve regurgitation.   4. Aortic valve: The valve is trileaflet. There is no valvular aortic regurgitation.   5. Tricuspid valve: There is trace tricuspid regurgitation.   6. Pericardium, extracardiac: There is no pericardial effusion.   Anesthesia Plan  ASA Score- 4 Emergent    Anesthesia Type- IV sedation with anesthesia with ASA Monitors.         Additional Monitors:     Airway Plan:            Plan Factors-    Chart reviewed.   Existing labs reviewed. Patient summary reviewed.    Patient is not a current smoker.              Induction- intravenous.    Postoperative Plan-     Informed Consent- Anesthetic plan and risks discussed with patient.  I personally reviewed this patient with the CRNA. Discussed and agreed on the Anesthesia Plan with the  CRNA..

## 2023-12-29 NOTE — NURSING NOTE
Patient sent to Endo for scope. Blood in process. Last set of VS taken on MS3 at 1050, patient transferred at 11am

## 2023-12-29 NOTE — ASSESSMENT & PLAN NOTE
No previous history of anemia initially presented with bloody bowel movements hemoglobin was 6.2 on arrival  Hemoglobin approximately 7 after 8 units packed red blood cells  Hemoglobin noted to be 6.8 this morning we will transfuse 1 more unit packed red blood cells  Plan as outlined above  Transfuse if less than 7

## 2023-12-29 NOTE — ASSESSMENT & PLAN NOTE
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

## 2023-12-29 NOTE — PROGRESS NOTES
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 status post APC and cauterization on 12/26  Underwent colonoscopy on 12/27 that revealed melena throughout the colon but did not reveal any active site of bleeding  Underwent capsule endoscopy on 12/28 which revealed jejunal bleeding  Plan for EGD with push enteroscopy today  Hemoglobin today noted to be 6.8 we will transfuse 1 more unit packed red blood cell  Monitor hemoglobin further bowel movements  Appreciate GI recommendations    Chronic obstructive pulmonary disease (HCC)  Assessment & Plan  Continue as needed inhaler    Acute blood loss anemia (ABLA)  Assessment & Plan    No previous history of anemia initially presented with bloody bowel movements hemoglobin was 6.2 on arrival  Hemoglobin approximately 7 after 8 units packed red blood cells  Hemoglobin noted to be 6.8 this morning we will transfuse 1 more unit packed red blood cells  Plan as outlined above  Transfuse if less than 7    CAD (coronary artery disease)  Assessment & Plan  Status post PCI  Currently without chest pain  Continue to hold aspirin in setting of GI bleed    Syncope  Assessment & Plan  Orthostatic GI blood loss  Since resolved         VTE Pharmacologic Prophylaxis:   Pharmacologic: Pharmacologic VTE Prophylaxis contraindicated due to GI bleeding  Mechanical VTE Prophylaxis in Place: Yes    Patient Centered Rounds: I have performed bedside rounds with  nursing staff today.    Discussions with Specialists or Other Care Team Provider: cm, nursing    Education and Discussions with Family / Patient: pt, wife    Time Spent for Care: 45 minutes.  More than 50% of total time spent on counseling and coordination of care as described above.    Current Length of Stay: 4 day(s)    Current Patient Status: Inpatient   Certification Statement: The patient will continue to require additional inpatient hospital stay due to see below    Discharge Plan: Requiring further monitoring her blood counts and hemoglobin/further evaluation of GI bleeding anticipate approximate 48 hours    Code Status: Level 1 - Full Code      Subjective:   Denies fevers, chills abdominal pain, nausea, vomiting    Objective:     Vitals:   Temp (24hrs), Av.3 °F (36.8 °C), Min:97.5 °F (36.4 °C), Max:98.9 °F (37.2 °C)    Temp:  [97.5 °F (36.4 °C)-98.9 °F (37.2 °C)] 98.1 °F (36.7 °C)  HR:  [74-78] 75  Resp:  [14-20] 16  BP: ()/(44-59) 108/57  SpO2:  [92 %-98 %] 98 %  Body mass index is 26.47 kg/m².     Input and Output Summary (last 24 hours):       Intake/Output Summary (Last 24 hours) at 2023 1029  Last data filed at 2023 0706  Gross per 24 hour   Intake 2967.5 ml   Output 1350 ml   Net 1617.5 ml       Physical Exam:     Physical Exam  Constitutional:       General: He is not in acute distress.     Appearance: He is well-developed. He is not diaphoretic.   HENT:      Head: Normocephalic and atraumatic.      Nose: Nose normal.      Mouth/Throat:      Pharynx: No oropharyngeal exudate.   Eyes:      General: No scleral icterus.     Conjunctiva/sclera: Conjunctivae normal.   Cardiovascular:      Rate and Rhythm: Normal rate and regular rhythm.      Heart sounds: Normal heart sounds. No murmur heard.     No friction rub. No gallop.   Pulmonary:      Effort: Pulmonary effort is normal. No respiratory distress.      Breath sounds: Normal breath sounds. No wheezing or rales.   Chest:      Chest  wall: No tenderness.   Abdominal:      General: Bowel sounds are normal. There is no distension.      Palpations: Abdomen is soft.      Tenderness: There is no abdominal tenderness. There is no guarding.   Musculoskeletal:         General: No tenderness or deformity. Normal range of motion.      Cervical back: Normal range of motion and neck supple.   Skin:     General: Skin is warm and dry.      Findings: No erythema.   Neurological:      Mental Status: He is alert. Mental status is at baseline.         Additional Data:     Labs:    Results from last 7 days   Lab Units 12/29/23  0633   WBC Thousand/uL 7.63   HEMOGLOBIN g/dL 6.8*   HEMATOCRIT % 21.5*   PLATELETS Thousands/uL 136*   NEUTROS PCT % 68   LYMPHS PCT % 20   MONOS PCT % 8   EOS PCT % 3     Results from last 7 days   Lab Units 12/29/23  0633 12/27/23  0246 12/26/23  0435   SODIUM mmol/L 138   < > 138   POTASSIUM mmol/L 3.8   < > 3.8   CHLORIDE mmol/L 109*   < > 110*   CO2 mmol/L 28   < > 26   BUN mg/dL 17   < > 39*   CREATININE mg/dL 0.83   < > 0.91   ANION GAP mmol/L 1   < > 2   CALCIUM mg/dL 7.4*   < > 7.7*   ALBUMIN g/dL  --   --  2.9*   TOTAL BILIRUBIN mg/dL  --   --  0.45   ALK PHOS U/L  --   --  34   ALT U/L  --   --  11   AST U/L  --   --  10*   GLUCOSE RANDOM mg/dL 107   < > 108    < > = values in this interval not displayed.     Results from last 7 days   Lab Units 12/27/23  0246   INR  1.22*     Results from last 7 days   Lab Units 12/29/23  0638 12/27/23  0235 12/26/23  0656   POC GLUCOSE mg/dl 119 136 117                   * I Have Reviewed All Lab Data Listed Above.  * Additional Pertinent Lab Tests Reviewed: All Labs Within Last 24 Hours Reviewed    Imaging:    Imaging Reports Reviewed Today Include: na  Imaging Personally Reviewed by Myself Includes:  na    Recent Cultures (last 7 days):           Last 24 Hours Medication List:   Current Facility-Administered Medications   Medication Dose Route Frequency Provider Last Rate    acetaminophen   650 mg Oral Q6H PRN Gio Delvalle III, MD      atorvastatin  20 mg Oral Daily With Dinner Gio Delvalle III, MD      diphenhydrAMINE  25 mg Intravenous HS PRN Brigitte Crockett PA-C      hydrOXYzine HCL  25 mg Oral Q6H PRN Timur Wright MD      metoclopramide  10 mg Intravenous TID Gio Delvalle III, MD      multi-electrolyte  75 mL/hr Intravenous Continuous Gio Delvalle III, MD 75 mL/hr (12/28/23 2119)    pantoprazole  40 mg Intravenous Q12H Gio Delvalle III, MD          Today, Patient Was Seen By: Timur Wright MD    ** Please Note: Dictation voice to text software may have been used in the creation of this document. **

## 2023-12-29 NOTE — CASE MANAGEMENT
Case Management Discharge Planning Note    Patient name Ted Maynard  Location /-02 MRN [id number]  : 1955 Date 2023       Current Admission Date: 2023  Current Admission Diagnosis:GI bleed   Patient Active Problem List    Diagnosis Date Noted    Chronic obstructive pulmonary disease (HCC) 2023    GI bleed 2023    Syncope 2023    CAD (coronary artery disease) 2023    Acute blood loss anemia (ABLA) 2023    Abnormal CT of the chest 2019      LOS (days): 4  Geometric Mean LOS (GMLOS) (days): 3  Days to GMLOS:-1.4     OBJECTIVE:  Risk of Unplanned Readmission Score: 12.05         Current admission status: Inpatient   Preferred Pharmacy:   [id number] - [place], [place] - [address]  [address] [address]  Phone: [phone] Fax: [phone]    Primary Care Provider: No primary care provider on file.    Primary Insurance: MEDICARE  Secondary Insurance: BLUE CROSS    DISCHARGE DETAILS:    Discharge planning discussed with:: Patient and wife at the bedside.     Comments - Freedom of Choice: Discussed medical care plan, patient feeling better and optimistic about possibly going home tomorrow. Wife had questions regarding coverage of this admission and the EMS ride to ER. Discussed that best answer would come from calling insurance, but patient states will wait to see if they are sent a bill  CM contacted family/caregiver?: Yes  Were Treatment Team discharge recommendations reviewed with patient/caregiver?: Yes  Did patient/caregiver verbalize understanding of patient care needs?: Yes  Were patient/caregiver advised of the risks associated with not following Treatment Team discharge recommendations?: Yes    Contacts  Patient Contacts: Tanika Maynard (Spouse)  [phone] (Mobile)  Relationship to Patient:: Family  Contact Method: In Person  Reason/Outcome: Continuity of Care, Emergency Contact, Discharge  Planning    Requested Home Health Care         Is the patient interested in HHC at discharge?: No    DME Referral Provided  Referral made for DME?: No    Other Referral/Resources/Interventions Provided:  Interventions: None Indicated         Treatment Team Recommendation: Home  Discharge Destination Plan:: Home                                IMM Given (Date):: 12/29/23  IMM Given to:: Patient     Additional Comments: IMM and Medicare rights reviewed with patient at the bedside. Patient verbalized understanding and signed original. Copy given to patient, signed original filed to medical records.

## 2023-12-30 LAB
ABO GROUP BLD BPU: NORMAL
ANION GAP SERPL CALCULATED.3IONS-SCNC: 1 MMOL/L
BPU ID: NORMAL
BUN SERPL-MCNC: 16 MG/DL (ref 5–25)
CALCIUM SERPL-MCNC: 7.6 MG/DL (ref 8.4–10.2)
CHLORIDE SERPL-SCNC: 107 MMOL/L (ref 96–108)
CO2 SERPL-SCNC: 30 MMOL/L (ref 21–32)
CREAT SERPL-MCNC: 0.9 MG/DL (ref 0.6–1.3)
CROSSMATCH: NORMAL
GFR SERPL CREATININE-BSD FRML MDRD: 87 ML/MIN/1.73SQ M
GLUCOSE SERPL-MCNC: 116 MG/DL (ref 65–140)
HCT VFR BLD AUTO: 22.4 % (ref 36.5–49.3)
HCT VFR BLD AUTO: 22.5 % (ref 36.5–49.3)
HCT VFR BLD AUTO: 22.6 % (ref 36.5–49.3)
HGB BLD-MCNC: 7.4 G/DL (ref 12–17)
HGB BLD-MCNC: 7.5 G/DL (ref 12–17)
HGB BLD-MCNC: 7.5 G/DL (ref 12–17)
POTASSIUM SERPL-SCNC: 3.8 MMOL/L (ref 3.5–5.3)
SODIUM SERPL-SCNC: 138 MMOL/L (ref 135–147)
UNIT DISPENSE STATUS: NORMAL
UNIT PRODUCT CODE: NORMAL
UNIT PRODUCT VOLUME: 350 ML
UNIT RH: NORMAL

## 2023-12-30 PROCEDURE — 80048 BASIC METABOLIC PNL TOTAL CA: CPT | Performed by: INTERNAL MEDICINE

## 2023-12-30 PROCEDURE — C9113 INJ PANTOPRAZOLE SODIUM, VIA: HCPCS | Performed by: INTERNAL MEDICINE

## 2023-12-30 PROCEDURE — 85014 HEMATOCRIT: CPT | Performed by: INTERNAL MEDICINE

## 2023-12-30 PROCEDURE — 85018 HEMOGLOBIN: CPT | Performed by: INTERNAL MEDICINE

## 2023-12-30 PROCEDURE — 99232 SBSQ HOSP IP/OBS MODERATE 35: CPT | Performed by: INTERNAL MEDICINE

## 2023-12-30 PROCEDURE — 99232 SBSQ HOSP IP/OBS MODERATE 35: CPT

## 2023-12-30 RX ORDER — BENZOCAINE/MENTHOL 6 MG-10 MG
LOZENGE MUCOUS MEMBRANE 4 TIMES DAILY PRN
Status: DISCONTINUED | OUTPATIENT
Start: 2023-12-30 | End: 2023-12-31 | Stop reason: HOSPADM

## 2023-12-30 RX ADMIN — ATORVASTATIN CALCIUM 20 MG: 20 TABLET, FILM COATED ORAL at 16:00

## 2023-12-30 RX ADMIN — PANTOPRAZOLE SODIUM 40 MG: 40 INJECTION, POWDER, FOR SOLUTION INTRAVENOUS at 16:00

## 2023-12-30 RX ADMIN — PANTOPRAZOLE SODIUM 40 MG: 40 INJECTION, POWDER, FOR SOLUTION INTRAVENOUS at 04:18

## 2023-12-30 RX ADMIN — SODIUM CHLORIDE, SODIUM GLUCONATE, SODIUM ACETATE, POTASSIUM CHLORIDE, MAGNESIUM CHLORIDE, SODIUM PHOSPHATE, DIBASIC, AND POTASSIUM PHOSPHATE 75 ML/HR: .53; .5; .37; .037; .03; .012; .00082 INJECTION, SOLUTION INTRAVENOUS at 17:37

## 2023-12-30 RX ADMIN — SODIUM CHLORIDE, SODIUM GLUCONATE, SODIUM ACETATE, POTASSIUM CHLORIDE, MAGNESIUM CHLORIDE, SODIUM PHOSPHATE, DIBASIC, AND POTASSIUM PHOSPHATE 75 ML/HR: .53; .5; .37; .037; .03; .012; .00082 INJECTION, SOLUTION INTRAVENOUS at 04:20

## 2023-12-30 NOTE — PLAN OF CARE
Problem: Knowledge Deficit  Goal: Patient/family/caregiver demonstrates understanding of disease process, treatment plan, medications, and discharge instructions  Description: Complete learning assessment and assess knowledge base.  Interventions:  - Provide teaching at level of understanding  - Provide teaching via preferred learning methods  Outcome: Progressing     Problem: HEMATOLOGIC - ADULT  Goal: Maintains hematologic stability  Description: INTERVENTIONS  - Assess for signs and symptoms of bleeding or hemorrhage  - Monitor labs  - Administer supportive blood products/factors as ordered and appropriate  Outcome: Progressing     Problem: Nutrition/Hydration-ADULT  Goal: Nutrient/Hydration intake appropriate for improving, restoring or maintaining nutritional needs  Description: Monitor and assess patient's nutrition/hydration status for malnutrition. Collaborate with interdisciplinary team and initiate plan and interventions as ordered.  Monitor patient's weight and dietary intake as ordered or per policy. Utilize nutrition screening tool and intervene as necessary. Determine patient's food preferences and provide high-protein, high-caloric foods as appropriate.     INTERVENTIONS:  - Monitor oral intake, urinary output, labs, and treatment plans  - Assess nutrition and hydration status and recommend course of action  - Evaluate amount of meals eaten  - Assist patient with eating if necessary   - Allow adequate time for meals  - Recommend/ encourage appropriate diets, oral nutritional supplements, and vitamin/mineral supplements  - Order, calculate, and assess calorie counts as needed  - Recommend, monitor, and adjust tube feedings and TPN/PPN based on assessed needs  - Assess need for intravenous fluids  - Provide specific nutrition/hydration education as appropriate  - Include patient/family/caregiver in decisions related to nutrition  Outcome: Progressing

## 2023-12-30 NOTE — ASSESSMENT & PLAN NOTE
No previous history of anemia initially presented with bloody bowel movements hemoglobin was 6.2 on arrival  Hemoglobin approximately 7 after 8 units packed red blood cells  Hemoglobin current 7.5  Plan as outlined above  Transfuse if less than 7

## 2023-12-30 NOTE — ASSESSMENT & PLAN NOTE
Patient initially presented to the ED after a syncopal episode earlier at home noted increasing tachycardia and lightheadedness when ambulating noted dark tarry stools as well since Friday  Hemoglobin noted to be 6.2 on arrival  Has since received 8 units packed red blood cells   EGD revealed multiple duodenal AVMs that were actively bleeding status post APC and cauterization on 12/26  Underwent colonoscopy on 12/27 that revealed melena throughout the colon but did not reveal any active site of bleeding  Underwent capsule endoscopy on 12/28 which revealed jejunal bleeding  Plan for EGD with push enteroscopy today  Hemoglobin today noted to be 6.8 we will transfuse 1 more unit packed red blood cell  Monitor hemoglobin further bowel movements  Appreciate GI recommendations-continue to evaluate hemoglobin for additional 24 hours, hemoglobin currently 7.5

## 2023-12-30 NOTE — PROGRESS NOTES
Progress Note  Ted Maynard 68 y.o. male MRN: [id number]  Unit/Bed#: -02 Encounter: [id number]    Subjective:  Overall he feels much better.  His hemoglobin is stable.  It was 7.5 at midnight and is 7.5 this morning.  He has not had a bowel movement.  He has no fevers chills nausea vomiting he tolerated diet yesterday.  He has no dizziness or weakness.    Objective:     Vitals:   Vitals:    12/30/23 0751   BP: 107/55   Pulse:    Resp:    Temp: (!) 97.2 °F (36.2 °C)   SpO2:    ,Body mass index is 26.47 kg/m².      Intake/Output Summary (Last 24 hours) at 12/30/2023 0827  Last data filed at 12/30/2023 0420  Gross per 24 hour   Intake 2840 ml   Output 800 ml   Net 2040 ml       Physical Exam:    General Appearance: Alert, appears stated age and cooperative  Lungs: Clear to auscultation bilaterally, no rales or rhonchi, no labored breathing/accessory muscle use  Heart: Regular rate and rhythm, S1, S2 normal, no murmur, click, rub or gallop  Abdomen: Soft, non-tender, non-distended; bowel sounds normal; no masses or no organomegaly  Extremities: No cyanosis, edema    Invasive Devices       Peripheral Intravenous Line  Duration             Peripheral IV 12/27/23 Right;Ventral (anterior) Forearm 3 days    Peripheral IV 12/29/23 Right;Dorsal (posterior) Hand <1 day                    Lab Results:  No results displayed because visit has over 200 results.          Imaging Studies:   I have personally reviewed pertinent imaging studies.    EGD with Push Enteroscopy    Result Date: 12/29/2023  Narrative: Table formatting from the original result was not included.  [facility] Endoscopy [address] [facility] [address] [phone] DATE OF SERVICE: 12/29/23 PHYSICIAN(S): Attending: Gio Delvalle III, MD Fellow: No Staff Documented INDICATION: Anemia, Upper GI bleed, Gastrointestinal hemorrhage, unspecified gastrointestinal hemorrhage type POST-OP DIAGNOSIS: See the impression below.  PREPROCEDURE: Informed consent was obtained for the procedure, including sedation.  Risks of perforation, hemorrhage, adverse drug reaction and aspiration were discussed. The patient was placed in the left lateral decubitus position. Patient was explained about the risks and benefits of the procedure. Risks including but not limited to bleeding, infection, and perforation were explained in detail. Also explained about less than 100% sensitivity with the exam and other alternatives. PROCEDURE: EGD DETAILS OF PROCEDURE: Patient was taken to the procedure room where a time out was performed to confirm correct patient and correct procedure. The patient underwent monitored anesthesia care, which was administered by an anesthesia professional. The patient's blood pressure, ECG, heart rate, level of consciousness, oxygen and respirations were monitored throughout the procedure. The scope was introduced through the mouth and advanced to the jejunum. Fluoroscopy was not used. The patient experienced no blood loss. The procedure was not difficult. The patient tolerated the procedure well. There were no apparent adverse events. ANESTHESIA INFORMATION: ASA: IV Anesthesia Type: IV Sedation with Anesthesia MEDICATIONS: No administrations occurring from 1304 to 1334 on 12/29/23 FINDINGS: The upper third of the esophagus, middle third of the esophagus, lower third of the esophagus and GE junction appeared normal. 1 cm sliding hiatal hernia (type I hiatal hernia) without Kulwant lesions present The fundus of the stomach, body of the stomach and antrum appeared normal. Ulcerated mucosa in the duodenal bulb, 1st part of the duodenum and 2nd part of the duodenum; placed 1 clip successfully (clip is MRI compatible); hemostasis achieved. The prior APC sites in the duodenal bulb and second portion of the duodenum were clean base without active bleeding; a Hemoclip was applied to one base in the second portion of the duodenum 5 small  angioectasias in the jejunum; no bleeding was identified; induced coagulation and hemostasis achieved with with argon plasma coagulation. One site had active oozing of blood and APC cautery was applied SPECIMENS: * No specimens in log *     Impression: 1 cm hiatal hernia No active bleeding at the prior APC sites. Hemoclip applied to one prior APC site in the second portion of the duodenum 5 jejunal angioectasias were noted.  1 with active oozing of blood.  APC cautery was applied to all 5 sites RECOMMENDATION: Monitor CBC Regular diet Continue PPI for now Iron supplementation   Gio Delvalle III, MD     Colonoscopy    Result Date: 12/27/2023  Narrative: Table formatting from the original result was not included.  [facility] Endoscopy [address] [facility] [address] [phone] DATE OF SERVICE: 12/27/23 PHYSICIAN(S): Attending: Gio Delvalle III, MD Fellow: No Staff Documented INDICATION: Gastrointestinal hemorrhage, unspecified gastrointestinal hemorrhage type POST-OP DIAGNOSIS: See the impression below. HISTORY: Prior colonoscopy: 10 years ago. BOWEL PREPARATION: Golytely/Colyte/Trilyte PREPROCEDURE: Informed consent was obtained for the procedure, including sedation. Risks including but not limited to bleeding, infection, perforation, adverse drug reaction and aspiration were explained in detail. Also explained about less than 100% sensitivity with the exam and other alternatives. The patient was placed in the left lateral decubitus position. Procedure: Colonoscopy DETAILS OF PROCEDURE: Patient was taken to the procedure room where a time out was performed to confirm correct patient and correct procedure. The patient underwent monitored anesthesia care, which was administered by an anesthesia professional. The patient's blood pressure, heart rate, level of consciousness, oxygen, respirations and ECG were monitored throughout the procedure. A digital rectal exam was performed. The  scope was introduced through the anus and advanced to the cecum. Retroflexion was performed in the rectum. The quality of bowel preparation was evaluated using the [place] Bowel Preparation Scale with scores of: right colon = 2, transverse colon = 2, left colon = 2. The total BBPS score was 6. Bowel prep was adequate. The patient experienced no blood loss. The procedure was not difficult. The patient tolerated the procedure well. There were no apparent adverse events. ANESTHESIA INFORMATION: ASA: II Anesthesia Type: IV Sedation with Anesthesia MEDICATIONS: simethicone (MYLICON) 40 mg in sterile water 1,000 mL 80 mg (Totals for administrations occurring from 1338 to 1429 on 12/27/23) FINDINGS: Multiple small diverticula in the sigmoid colon The cecum, ascending colon, hepatic flexure, transverse colon, splenic flexure, descending colon, sigmoid colon, rectosigmoid and rectum appeared normal. The entire colon was filled with melena and the visualization was poor.  2.5 L of saline was used to lavage the colon and no active bleeding was noted.  The ileocecal valve was intubated revealing black stool in the ileum indicating a small bowel bleed EVENTS: Procedure Events Event Event Time ENDO CECUM REACHED 12/27/2023  2:12 PM ENDO SCOPE OUT TIME 12/27/2023  2:27 PM SPECIMENS: * No specimens in log * EQUIPMENT: Colonoscope -[id number]     Impression: Melena throughout the entire colon with intubation of the ileocecal valve with melena in the ileum indicating a small bowel bleed from above; the entire colon was lavaged using 2.5 L of saline and no active bleeding was noted Sigmoid diverticulosis RECOMMENDATION:  Repeat screening colonoscopy in 10 years Check hemoglobin every 8 hours Transfusion of PRBCs per Slim service Video capsule endoscopy tomorrow  Gio Delvalle III, MD     CT head wo contrast    Result Date: 12/27/2023  Narrative: CT BRAIN - WITHOUT CONTRAST INDICATION:   Head trauma, moderate-severe Head strike post  fall with LOC. COMPARISON:  None. TECHNIQUE:  CT examination of the brain was performed.  Multiplanar 2D reformatted images were created from the source data. Radiation dose length product (DLP) for this visit:  833 mGy-cm .  This examination, like all CT scans performed in the [facility], was performed utilizing techniques to minimize radiation dose exposure, including the use of iterative reconstruction and automated exposure control. IMAGE QUALITY:  Diagnostic. FINDINGS: PARENCHYMA: Decreased attenuation is noted in periventricular and subcortical white matter demonstrating an appearance that is statistically most likely to represent mild microangiopathic change. No CT signs of acute infarction.  No intracranial mass, mass effect or midline shift.  No acute parenchymal hemorrhage. VENTRICLES AND EXTRA-AXIAL SPACES:  Normal for the patient's age. VISUALIZED ORBITS: Normal visualized orbits. PARANASAL SINUSES: Moderate mucosal thickening of the visualized the paranasal sinuses. CALVARIUM AND EXTRACRANIAL SOFT TISSUES:  Normal.     Impression: No intracranial hemorrhage or calvarial fracture. Workstation performed: [id number]     EGD    Result Date: 12/26/2023  Narrative: Table formatting from the original result was not included.  [facility] Endoscopy [address] [facility] [address] [phone] DATE OF SERVICE: 12/26/23 PHYSICIAN(S): Attending: Gio Delvalle III, MD Fellow: No Staff Documented INDICATION: Upper GI bleed POST-OP DIAGNOSIS: See the impression below. PREPROCEDURE: Informed consent was obtained for the procedure, including sedation.  Risks of perforation, hemorrhage, adverse drug reaction and aspiration were discussed. The patient was placed in the left lateral decubitus position. Patient was explained about the risks and benefits of the procedure. Risks including but not limited to bleeding, infection, and perforation were explained in detail. Also  explained about less than 100% sensitivity with the exam and other alternatives. PROCEDURE: EGD DETAILS OF PROCEDURE: Patient was taken to the procedure room where a time out was performed to confirm correct patient and correct procedure. The patient underwent monitored anesthesia care, which was administered by an anesthesia professional. The patient's blood pressure, heart rate, level of consciousness, respirations and oxygen were monitored throughout the procedure. The scope was advanced to the second part of the duodenum. Retroflexion was performed in the fundus. The patient experienced no blood loss. The procedure was not difficult. The patient tolerated the procedure well. There were no apparent adverse events. ANESTHESIA INFORMATION: ASA: III Anesthesia Type: Anesthesia type not filed in the log. MEDICATIONS: EPINEPHrine PF (ADRENALIN) 1 mg/mL injection 0.8 mg (Totals for administrations occurring from 1359 to 1425 on 12/26/23) FINDINGS: The upper third of the esophagus, middle third of the esophagus, lower third of the esophagus and GE junction appeared normal. 1 cm sliding hiatal hernia (type I hiatal hernia) Mild, patchy erythematous, friable and granular mucosa in the fundus of the stomach, body of the stomach and antrum; performed cold forceps biopsy Multiple small angioectasias in the duodenal bulb and 1st part of the duodenum; bleeding was observed; induced coagulation and hemostasis achieved with with argon plasma coagulation; injected 6 mL of epinephrine to address bleeding; hemostasis not achieved SPECIMENS: ID Type Source Tests Collected by Time Destination 1 :  Tissue Stomach TISSUE EXAM Gio Delvalle III, MD 12/26/2023  2:23 PM      Impression: 1 cm hiatal hernia Mild erosive gastritis Multiple duodenal bulb and duodenal sweep angioectasias with active oozing of blood status post epinephrine injection and APC cautery with hemostasis achieved RECOMMENDATION:  Await pathology results Clear  liquid diet Monitor CBC Colonoscopy tomorrow Video capsule endoscopy as an outpatient Venofer course   Gio Delvalle III, MD     CTA chest wo w contrast    Result Date: 12/21/2023  Narrative: Facility: [facility] [place] Diagnostic Indication: Abnormal aortic root CT scan of the chest was performed pre- and postintravenous administration of contrast, 100 mL Omnipaque. Automated exposure control was utilized. Coronal and sagittal reformations were obtained MIP imaging was performed. Comparison was made to prior CT scan of the chest dated 11/8/2019 There is progressive prominence of the interstitial markings in the lower lobes bilaterally, right side greater than left, suggesting a component of interstitial lung disease. An area of linear atelectasis is noted at the right lung base posteriorly, with a subpleural nodular component measuring 1.2 cm, raising the possibility of a concomitant small region of round atelectasis. Mild COPD is noted No evidence of pleural effusion Heart is normal in size and configuration. Calcification is noted in the coronary arteries. Calcification is noted in the aorta. The aortic root measures 3.7 cm in greatest dimension, unchanged. The mid ascending aorta measures 3.6 cm, unchanged. No evidence of hilar or mediastinal lymphadenopathy No adrenal masses are noted. Bones are intact IMPRESSION: Interval progression of interstitial markings in the lower lobes bilaterally, suggesting interstitial lung disease/fibrosis Area of linear atelectasis noted at the right lung base posteriorly with a subpleural nodular component probably representing a small region of concomitant round atelectasis Mild COPD Calcific atherosclerosis Dilatation of the aortic root to 3.7 cm, unchanged. Electronically signed by:  Kevin Carmona MD  12/21/2023 03:02 PM EST Workstation: [id number]        Assessment & Plan  Principal Problem:    GI bleed  Active Problems:    Syncope    CAD (coronary artery disease)    Acute blood  loss anemia (ABLA)    Chronic obstructive pulmonary disease (HCC)      He is a 68-year-old male with massive GI blood loss anemia with syncopal episode secondary to multiple duodenal and jejunal angiectasia status post endoscopy with APC colonoscopy and EGD with push enteroscopy with APC treatment and Hemoclip x 1.  He has stopped bleeding and overall he feels well.    I think he will likely be able to go home tomorrow back to [place] with GI follow-up.  Diet as tolerated  Hemoglobin every 12 hours  Discharge planning for tomorrow per SLIM    We will need to give him our office number for his GI to follow-up if they do not have epic.  We can do this tomorrow    Gio Delvalle III, MD  12/30/2023,8:27 AM

## 2023-12-30 NOTE — PROGRESS NOTES
Count includes the [facility]  Progress Note  Name: Ted Maynard I  MRN: [id number]  Unit/Bed#: -02 I Date of Admission: 12/25/2023   Date of Service: 12/30/2023 I Hospital Day: 5    Assessment/Plan   * GI bleed  Assessment & Plan  Patient initially presented to the ED after a syncopal episode earlier at home noted increasing tachycardia and lightheadedness when ambulating noted dark tarry stools as well since Friday  Hemoglobin noted to be 6.2 on arrival  Has since received 8 units packed red blood cells   EGD revealed multiple duodenal AVMs that were actively bleeding status post APC and cauterization on 12/26  Underwent colonoscopy on 12/27 that revealed melena throughout the colon but did not reveal any active site of bleeding  Underwent capsule endoscopy on 12/28 which revealed jejunal bleeding  Plan for EGD with push enteroscopy today  Hemoglobin today noted to be 6.8 we will transfuse 1 more unit packed red blood cell  Monitor hemoglobin further bowel movements  Appreciate GI recommendations-continue to evaluate hemoglobin for additional 24 hours, hemoglobin currently 7.5    Chronic obstructive pulmonary disease (HCC)  Assessment & Plan  Continue as needed inhaler    Acute blood loss anemia (ABLA)  Assessment & Plan    No previous history of anemia initially presented with bloody bowel movements hemoglobin was 6.2 on arrival  Hemoglobin approximately 7 after 8 units packed red blood cells  Hemoglobin current 7.5  Plan as outlined above  Transfuse if less than 7    CAD (coronary artery disease)  Assessment & Plan  Status post PCI  Currently without chest pain  Continue to hold aspirin in setting of GI bleed    Syncope  Assessment & Plan  Orthostatic GI blood loss  Since resolved           VTE Pharmacologic Prophylaxis:   Moderate Risk (Score 3-4) - Pharmacological DVT Prophylaxis Contraindicated. Sequential Compression Devices Ordered.    Mobility:   Basic Mobility Inpatient Raw Score:  24  JH-HLM Goal: 8: Walk 250 feet or more  JH-HLM Achieved: 6: Walk 10 steps or more  HLM Goal achieved. Continue to encourage appropriate mobility.    Patient Centered Rounds: I performed bedside rounds with nursing staff today.   Discussions with Specialists or Other Care Team Provider: CM, GI    Education and Discussions with Family / Patient: Updated  (wife and friend) at bedside.    Total Time Spent on Date of Encounter in care of patient: 35 mins. This time was spent on one or more of the following: performing physical exam; counseling and coordination of care; obtaining or reviewing history; documenting in the medical record; reviewing/ordering tests, medications or procedures; communicating with other healthcare professionals and discussing with patient's family/caregivers.    Current Length of Stay: 5 day(s)  Current Patient Status: Inpatient   Certification Statement: The patient will continue to require additional inpatient hospital stay due to observing hemoglobin for additional 24 hours  Discharge Plan: Anticipate discharge tomorrow to home.    Code Status: Level 1 - Full Code    Subjective:   Patient reports feeling well.  He has not had a bowel movement in past 24 hours.  He denies chest pain/pressure, palpitations, numbness, nausea, shortness breath, or chills.    Objective:     Vitals:   Temp (24hrs), Av.8 °F (36.6 °C), Min:97.2 °F (36.2 °C), Max:98.5 °F (36.9 °C)    Temp:  [97.2 °F (36.2 °C)-98.5 °F (36.9 °C)] 98.1 °F (36.7 °C)  HR:  [71-76] 71  BP: (100-107)/(55-60) 102/60  SpO2:  [95 %-98 %] 96 %  Body mass index is 26.47 kg/m².     Input and Output Summary (last 24 hours):     Intake/Output Summary (Last 24 hours) at 2023 1535  Last data filed at 2023 0420  Gross per 24 hour   Intake 1205 ml   Output 550 ml   Net 655 ml       Physical Exam:   Physical Exam  Vitals and nursing note reviewed.   Constitutional:       Appearance: He is normal weight.   HENT:      Head:  Normocephalic.      Nose: Nose normal.      Mouth/Throat:      Mouth: Mucous membranes are moist.      Pharynx: Oropharynx is clear.   Eyes:      General: No scleral icterus.     Conjunctiva/sclera: Conjunctivae normal.      Pupils: Pupils are equal, round, and reactive to light.   Cardiovascular:      Rate and Rhythm: Normal rate and regular rhythm.      Heart sounds: No murmur heard.     No friction rub. No gallop.   Pulmonary:      Effort: Pulmonary effort is normal. No respiratory distress.      Breath sounds: Normal breath sounds. No stridor. No wheezing, rhonchi or rales.   Abdominal:      General: Abdomen is flat.      Palpations: Abdomen is soft.   Musculoskeletal:         General: Normal range of motion.      Cervical back: Normal range of motion and neck supple.      Right lower leg: No edema.      Left lower leg: No edema.   Lymphadenopathy:      Cervical: No cervical adenopathy.   Skin:     General: Skin is warm.      Coloration: Skin is not jaundiced or pale.      Findings: No bruising, erythema or lesion.   Neurological:      General: No focal deficit present.      Mental Status: He is alert and oriented to person, place, and time. Mental status is at baseline.      Cranial Nerves: No cranial nerve deficit.      Motor: No weakness.   Psychiatric:         Mood and Affect: Mood normal.         Behavior: Behavior normal.         Thought Content: Thought content normal.          Additional Data:     Labs:  Results from last 7 days   Lab Units 12/30/23  0615 12/29/23  1513 12/29/23  0633   WBC Thousand/uL  --   --  7.63   HEMOGLOBIN g/dL 7.5*   < > 6.8*   HEMATOCRIT % 22.4*   < > 21.5*   PLATELETS Thousands/uL  --   --  136*   NEUTROS PCT %  --   --  68   LYMPHS PCT %  --   --  20   MONOS PCT %  --   --  8   EOS PCT %  --   --  3    < > = values in this interval not displayed.     Results from last 7 days   Lab Units 12/30/23  0620 12/27/23  0246 12/26/23  0435   SODIUM mmol/L 138   < > 138   POTASSIUM  mmol/L 3.8   < > 3.8   CHLORIDE mmol/L 107   < > 110*   CO2 mmol/L 30   < > 26   BUN mg/dL 16   < > 39*   CREATININE mg/dL 0.90   < > 0.91   ANION GAP mmol/L 1   < > 2   CALCIUM mg/dL 7.6*   < > 7.7*   ALBUMIN g/dL  --   --  2.9*   TOTAL BILIRUBIN mg/dL  --   --  0.45   ALK PHOS U/L  --   --  34   ALT U/L  --   --  11   AST U/L  --   --  10*   GLUCOSE RANDOM mg/dL 116   < > 108    < > = values in this interval not displayed.     Results from last 7 days   Lab Units 12/27/23  0246   INR  1.22*     Results from last 7 days   Lab Units 12/29/23  1207 12/29/23  0638 12/27/23  0235 12/26/23  0656   POC GLUCOSE mg/dl 107 119 136 117               Lines/Drains:  Invasive Devices       Peripheral Intravenous Line  Duration             Peripheral IV 12/27/23 Right;Ventral (anterior) Forearm 3 days    Peripheral IV 12/29/23 Right;Dorsal (posterior) Hand 1 day                          Imaging: Reviewed radiology reports from this admission including: procedure reports    Recent Cultures (last 7 days):         Last 24 Hours Medication List:   Current Facility-Administered Medications   Medication Dose Route Frequency Provider Last Rate    acetaminophen  650 mg Oral Q6H PRN Gio Delvalle III, MD      atorvastatin  20 mg Oral Daily With Dinner Gio Delvalle III, MD      diphenhydrAMINE  25 mg Intravenous HS PRN Gio Delvalle III, MD      hydrOXYzine HCL  25 mg Oral Q6H PRN Gio Delvalle III, MD      multi-electrolyte  75 mL/hr Intravenous Continuous Gio Delvalle III, MD 75 mL/hr (12/30/23 0420)    pantoprazole  40 mg Intravenous Q12H Gio Delvalle III, MD          Today, Patient Was Seen By: Micheal Cope PA-C    **Please Note: This note may have been constructed using a voice recognition system.**

## 2023-12-31 VITALS
SYSTOLIC BLOOD PRESSURE: 114 MMHG | TEMPERATURE: 98.6 F | DIASTOLIC BLOOD PRESSURE: 63 MMHG | RESPIRATION RATE: 20 BRPM | HEIGHT: 71 IN | BODY MASS INDEX: 26.57 KG/M2 | OXYGEN SATURATION: 95 % | HEART RATE: 73 BPM | WEIGHT: 189.82 LBS

## 2023-12-31 LAB
HCT VFR BLD AUTO: 23.3 % (ref 36.5–49.3)
HGB BLD-MCNC: 7.6 G/DL (ref 12–17)

## 2023-12-31 PROCEDURE — 85018 HEMOGLOBIN: CPT | Performed by: INTERNAL MEDICINE

## 2023-12-31 PROCEDURE — 99232 SBSQ HOSP IP/OBS MODERATE 35: CPT | Performed by: INTERNAL MEDICINE

## 2023-12-31 PROCEDURE — C9113 INJ PANTOPRAZOLE SODIUM, VIA: HCPCS | Performed by: INTERNAL MEDICINE

## 2023-12-31 PROCEDURE — 85014 HEMATOCRIT: CPT | Performed by: INTERNAL MEDICINE

## 2023-12-31 PROCEDURE — 91110 GI TRC IMG INTRAL ESOPH-ILE: CPT | Performed by: INTERNAL MEDICINE

## 2023-12-31 PROCEDURE — 99239 HOSP IP/OBS DSCHRG MGMT >30: CPT

## 2023-12-31 RX ORDER — FERROUS SULFATE 324(65)MG
324 TABLET, DELAYED RELEASE (ENTERIC COATED) ORAL EVERY OTHER DAY
Qty: 30 TABLET | Refills: 0 | Status: SHIPPED | OUTPATIENT
Start: 2023-12-31 | End: 2024-02-29

## 2023-12-31 RX ADMIN — HYDROCORTISONE 1 APPLICATION: 1 CREAM TOPICAL at 10:15

## 2023-12-31 RX ADMIN — PANTOPRAZOLE SODIUM 40 MG: 40 INJECTION, POWDER, FOR SOLUTION INTRAVENOUS at 05:09

## 2023-12-31 RX ADMIN — DIPHENHYDRAMINE HYDROCHLORIDE 25 MG: 50 INJECTION, SOLUTION INTRAMUSCULAR; INTRAVENOUS at 00:26

## 2023-12-31 RX ADMIN — HYDROCORTISONE: 1 CREAM TOPICAL at 00:26

## 2023-12-31 NOTE — DISCHARGE SUMMARY
[facility]  Discharge- Ted Maynard 1955, 68 y.o. male MRN: [id number]  Unit/Bed#: -02 Encounter: [id number]  Primary Care Provider: No primary care provider on file.   Date and time admitted to hospital: 12/25/2023  5:09 AM    * GI bleed  Assessment & Plan  Patient initially presented to the ED after a syncopal episode earlier at home noted increasing tachycardia and lightheadedness when ambulating noted dark tarry stools as well since Friday  Hemoglobin noted to be 6.2 on arrival  Has since received 8 units packed red blood cells   EGD revealed multiple duodenal AVMs that were actively bleeding status post APC and cauterization on 12/26  Underwent colonoscopy on 12/27 that revealed melena throughout the colon but did not reveal any active site of bleeding  Underwent capsule endoscopy on 12/28 which revealed jejunal bleeding  Plan for EGD with push enteroscopy-no active bleed  Monitor hemoglobin further bowel movements  Appreciate GI recommendations-hemoglobin remained stable at 7.6    Chronic obstructive pulmonary disease (HCC)  Assessment & Plan  Continue as needed inhaler    Acute blood loss anemia (ABLA)  Assessment & Plan    No previous history of anemia initially presented with bloody bowel movements hemoglobin was 6.2 on arrival  Hemoglobin approximately 7 after 8 units packed red blood cells  Hemoglobin current 7.6 and stable  Plan as outlined above  Transfuse if less than 7    CAD (coronary artery disease)  Assessment & Plan  Status post PCI  Currently without chest pain  Continue to hold aspirin in setting of GI bleed    Syncope  Assessment & Plan  Orthostatic GI blood loss  Since resolved      Medical Problems       Resolved Problems  Date Reviewed: 12/29/2023   None       Discharging Physician / Practitioner: Micheal Cope PA-C  PCP: No primary care provider on file.  Admission Date:   Admission Orders (From admission, onward)       Ordered        12/25/23  0554  Inpatient Admission  Once                          Discharge Date: 12/31/23    Consultations During Hospital Stay:  GI    Procedures Performed:   EGD with Push Enteroscopy  Impression: 1 cm hiatal hernia No active bleeding at the prior APC sites. Hemoclip applied to one prior APC site in the second portion of the duodenum 5 jejunal angioectasias were noted.  1 with active oozing of blood.  APC cautery was applied to all 5 sites    Colonoscopy  Impression: Melena throughout the entire colon with intubation of the ileocecal valve with melena in the ileum indicating a small bowel bleed from above; the entire colon was lavaged using 2.5 L of saline and no active bleeding was noted Sigmoid diverticulosis     CT head wo contrast  Impression: No intracranial hemorrhage or calvarial fracture    EGD  Impression: 1 cm hiatal hernia Mild erosive gastritis Multiple duodenal bulb and duodenal sweep angioectasias with active oozing of blood status post epinephrine injection and APC cautery with hemostasis achieved     CTA chest wo w contrast  IMPRESSION: Interval progression of interstitial markings in the lower lobes bilaterally, suggesting interstitial lung disease/fibrosis Area of linear atelectasis noted at the right lung base posteriorly with a subpleural nodular component probably representing a small region of concomitant round atelectasis Mild COPD Calcific atherosclerosis Dilatation of the aortic root to 3.7 cm, unchanged      Significant Findings / Test Results:   Last 4 hemoglobin checks remain within 7.4-7.6, during hospitalization hemoglobin down trended to in the low sixes  Iron saturation 65, TIBC 236, iron 153, UIBC 83      Incidental Findings:   None      Test Results Pending at Discharge (will require follow up):   None     Outpatient Tests Requested:  Prior to vacation, recommended to take hemoglobin    Complications: None    Reason for Admission: GI bleed    Hospital Course:   Ted Maynard is a 68  "y.o. male patient who originally presented to the hospital on 12/25/2023 due to syncopal episode prior to admission.  The patient reported feeling increasing tachycardia lightheadedness when ambulating in the bathroom.  During admission the patient was found to have a hemoglobin of 6.2 and was he was given 1 unit of PRBC.  The patient was kept n.p.o. and GI was consulted.  The patient had an EGD done on 12/26 which showed multiple active oozing angiectasias that required APC cautery.  He then proceeded to have a colonoscopy on 12/27 which showed melena throughout entire colon however no active bleeding sites.  The patient continued to require multiple units of PRBC.  An additional push E GD was conducted on 12/28 which continue to show 1 active oozing site.  Hemoclip was applied in numerous areas had APC cautery.  The patient was observed and hemoglobin was trended.  Hemoglobin was found to be stable.  On 12/31 the patient is medically stable to be discharged and agreeable for plan at discharge.  For further information regards course hospitalization, please see notes attached.      Please see above list of diagnoses and related plan for additional information.     Condition at Discharge: good    Discharge Day Visit / Exam:   Subjective: Patient reports feeling well and like to be discharged home.  He denies chest pain/pressure, palpitations, lightheadedness, nausea, shortness of breath, or chills.  Vitals: Blood Pressure: 114/63 (12/31/23 0640)  Pulse: 73 (12/31/23 0640)  Temperature: 98.6 °F (37 °C) (12/31/23 0640)  Temp Source: Temporal (12/29/23 1339)  Respirations: 20 (12/29/23 1358)  Height: 5' 11\" (180.3 cm) (12/26/23 1305)  Weight - Scale: 86.1 kg (189 lb 13.1 oz) (12/26/23 1305)  SpO2: 95 % (12/31/23 0640)  Exam:   Physical Exam  Vitals and nursing note reviewed.   Constitutional:       Appearance: He is normal weight.   HENT:      Head: Normocephalic.      Nose: Nose normal.      Mouth/Throat:      Mouth: " Mucous membranes are moist.      Pharynx: Oropharynx is clear.   Eyes:      General: No scleral icterus.     Conjunctiva/sclera: Conjunctivae normal.      Pupils: Pupils are equal, round, and reactive to light.   Cardiovascular:      Rate and Rhythm: Normal rate and regular rhythm.      Heart sounds: No murmur heard.     No friction rub. No gallop.   Pulmonary:      Effort: Pulmonary effort is normal. No respiratory distress.      Breath sounds: Normal breath sounds. No stridor. No wheezing, rhonchi or rales.   Abdominal:      General: Abdomen is flat.      Palpations: Abdomen is soft.   Musculoskeletal:         General: Normal range of motion.      Cervical back: Normal range of motion and neck supple.      Right lower leg: No edema.      Left lower leg: No edema.   Lymphadenopathy:      Cervical: No cervical adenopathy.   Skin:     General: Skin is warm.      Coloration: Skin is not jaundiced or pale.      Findings: No bruising, erythema or lesion.   Neurological:      General: No focal deficit present.      Mental Status: He is alert and oriented to person, place, and time. Mental status is at baseline.      Cranial Nerves: No cranial nerve deficit.      Motor: No weakness.   Psychiatric:         Mood and Affect: Mood normal.         Behavior: Behavior normal.         Thought Content: Thought content normal.          Discussion with Family: Updated  (wife) at bedside.    Discharge instructions/Information to patient and family:   See after visit summary for information provided to patient and family.      Provisions for Follow-Up Care:  See after visit summary for information related to follow-up care and any pertinent home health orders.      Mobility at time of Discharge:   Basic Mobility Inpatient Raw Score: 24  JH-HLM Goal: 8: Walk 250 feet or more  JH-HLM Achieved: 7: Walk 25 feet or more  HLM Goal achieved. Continue to encourage appropriate mobility.     Disposition:   Home    Planned  Readmission: No     Discharge Statement:  I spent 60 minutes discharging the patient. This time was spent on the day of discharge. I had direct contact with the patient on the day of discharge. Greater than 50% of the total time was spent examining patient, answering all patient questions, arranging and discussing plan of care with patient as well as directly providing post-discharge instructions.  Additional time then spent on discharge activities.    Discharge Medications:  See after visit summary for reconciled discharge medications provided to patient and/or family.      **Please Note: This note may have been constructed using a voice recognition system**

## 2023-12-31 NOTE — NURSING NOTE
Primary RN noticed rash on patients back. SLIM aware. Recommended to change linens and cleanse back.

## 2023-12-31 NOTE — PROGRESS NOTES
"Progress Note - Ted Maynard 68 y.o. male MRN: [id number]    Unit/Bed#: -02 Encounter: [id number]    Subjective: He feels well today without lightheadedness, dizziness. Had a mostly brown BM last night. No abdominal pain. Tolerated a shower without feeling lightheaded. Eating well.    Objective:     Vitals: Blood pressure 114/63, pulse 73, temperature 98.6 °F (37 °C), resp. rate 20, height 5' 11\" (1.803 m), weight 86.1 kg (189 lb 13.1 oz), SpO2 95%.,Body mass index is 26.47 kg/m².      Intake/Output Summary (Last 24 hours) at 12/31/2023 0923  Last data filed at 12/30/2023 2211  Gross per 24 hour   Intake 1460 ml   Output --   Net 1460 ml       Physical Exam:     General Appearance: Alert, oriented x3, no acute distress  Lungs: Clear to auscultation bilaterally, no rales or rhonchi, no labored breathing/accessory muscle use  Heart: Regular rate and rhythm, S1, S2 normal, no murmur, click, rub or gallop  Abdomen: Soft, non-tender, non-distended; bowel sounds normal; no masses or no organomegaly  Extremities: No cyanosis, edema    Invasive Devices       Peripheral Intravenous Line  Duration             Peripheral IV 12/27/23 Right;Ventral (anterior) Forearm 4 days    Peripheral IV 12/29/23 Right;Dorsal (posterior) Hand 1 day                    Lab Results:  Results from last 7 days   Lab Units 12/31/23  0808 12/29/23  1513 12/29/23  0633   WBC Thousand/uL  --   --  7.63   HEMOGLOBIN g/dL 7.6*   < > 6.8*   HEMATOCRIT % 23.3*   < > 21.5*   PLATELETS Thousands/uL  --   --  136*   NEUTROS PCT %  --   --  68   LYMPHS PCT %  --   --  20   MONOS PCT %  --   --  8   EOS PCT %  --   --  3    < > = values in this interval not displayed.     Results from last 7 days   Lab Units 12/30/23  0620 12/27/23  0246 12/26/23  0435   POTASSIUM mmol/L 3.8   < > 3.8   CHLORIDE mmol/L 107   < > 110*   CO2 mmol/L 30   < > 26   BUN mg/dL 16   < > 39*   CREATININE mg/dL 0.90   < > 0.91   CALCIUM mg/dL 7.6*   < > 7.7*   ALK PHOS U/L  -- "   --  34   ALT U/L  --   --  11   AST U/L  --   --  10*    < > = values in this interval not displayed.     Results from last 7 days   Lab Units 12/27/23  0246   INR  1.22*           Imaging Studies: I have personally reviewed pertinent imaging studies.    EGD with Push Enteroscopy    Result Date: 12/29/2023  Impression: 1 cm hiatal hernia No active bleeding at the prior APC sites. Hemoclip applied to one prior APC site in the second portion of the duodenum 5 jejunal angioectasias were noted.  1 with active oozing of blood.  APC cautery was applied to all 5 sites RECOMMENDATION: Monitor CBC Regular diet Continue PPI for now Iron supplementation   Gio Delvalle III, MD     Colonoscopy    Result Date: 12/27/2023  Impression: Melena throughout the entire colon with intubation of the ileocecal valve with melena in the ileum indicating a small bowel bleed from above; the entire colon was lavaged using 2.5 L of saline and no active bleeding was noted Sigmoid diverticulosis RECOMMENDATION:  Repeat screening colonoscopy in 10 years Check hemoglobin every 8 hours Transfusion of PRBCs per [facility] service Video capsule endoscopy tomorrow  Gio Delvalle III, MD     CT head wo contrast    Result Date: 12/27/2023  Impression: No intracranial hemorrhage or calvarial fracture. Workstation performed: [id number]     EGD  Result Date: 12/26/2023  Impression: 1 cm hiatal hernia Mild erosive gastritis Multiple duodenal bulb and duodenal sweep angioectasias with active oozing of blood status post epinephrine injection and APC cautery with hemostasis achieved RECOMMENDATION:  Await pathology results Clear liquid diet Monitor CBC Colonoscopy tomorrow Video capsule endoscopy as an outpatient Venofer course   Gio Delvalle III, MD       Assessment and Plan:     Upper GI Bleeding  Duodenal and Jejunal Angioectasias  Acute Blood Loss Anemia  - He presented with significant melena and a syncopal episode due to a drop in Hb from 14 in  October to 6.2 on admission requiring 9 units of blood total since 12/25  - EGD 12/26 showed a 1 cm sliding hiatal hernia, mild gastritis, and multiple small angioectasias in the duodenal bulb and 1st part of duodenum with bleeding s/p APC and epi  - Colonoscopy 12/27 showed melena throughout the colon and TI; entire colon had to be lavaged, sigmoid diverticulosis noted without active bleeding  - Pill cam 12/28 showed active bleeding 4 minutes after pill passage into the duodenum indication upper jejunal bleeding  - EGD with push enteroscopy 12/29 showed 5 jejunal angioectasias in the 2nd portion of the duodenum, 1 with active bleeding a/p APC to all 5 sites, hemoclip applied to one prior APC site from initial EGD as the site was ulcerated  - Hb has remained stable over the past 48 hours with HD stability and no further overt bleeding  - Okay for discharge from a GI standpoint  - He will follow up with his PCP and a GI in [place]  - Recommend H/H next week  - Discussed OTC iron supplementation and iron rich foods  - He has vacation scheduled in 2 weeks and we advised him that if he is not symptomatic from anemia and has his H/H checked showing an improving hemoglobin, he would be clear to go on vacation  - Okay to resume baby aspirin      The patient was seen by Dr. Delvalle. GI will sign off.

## 2023-12-31 NOTE — ASSESSMENT & PLAN NOTE
No previous history of anemia initially presented with bloody bowel movements hemoglobin was 6.2 on arrival  Hemoglobin approximately 7 after 8 units packed red blood cells  Hemoglobin current 7.6 and stable  Plan as outlined above  Transfuse if less than 7

## 2023-12-31 NOTE — ASSESSMENT & PLAN NOTE
Patient initially presented to the ED after a syncopal episode earlier at home noted increasing tachycardia and lightheadedness when ambulating noted dark tarry stools as well since Friday  Hemoglobin noted to be 6.2 on arrival  Has since received 8 units packed red blood cells   EGD revealed multiple duodenal AVMs that were actively bleeding status post APC and cauterization on 12/26  Underwent colonoscopy on 12/27 that revealed melena throughout the colon but did not reveal any active site of bleeding  Underwent capsule endoscopy on 12/28 which revealed jejunal bleeding  Plan for EGD with push enteroscopy-no active bleed  Monitor hemoglobin further bowel movements  Appreciate GI recommendations-hemoglobin remained stable at 7.6

## 2023-12-31 NOTE — PLAN OF CARE
Problem: PAIN - ADULT  Goal: Verbalizes/displays adequate comfort level or baseline comfort level  Description: Interventions:  - Encourage patient to monitor pain and request assistance  - Assess pain using appropriate pain scale  - Administer analgesics based on type and severity of pain and evaluate response  - Implement non-pharmacological measures as appropriate and evaluate response  - Consider cultural and social influences on pain and pain management  - Notify physician/advanced practitioner if interventions unsuccessful or patient reports new pain  Outcome: Progressing     Problem: INFECTION - ADULT  Goal: Absence or prevention of progression during hospitalization  Description: INTERVENTIONS:  - Assess and monitor for signs and symptoms of infection  - Monitor lab/diagnostic results  - Monitor all insertion sites, i.e. indwelling lines, tubes, and drains  - Monitor endotracheal if appropriate and nasal secretions for changes in amount and color  - [place] appropriate cooling/warming therapies per order  - Administer medications as ordered  - Instruct and encourage patient and family to use good hand hygiene technique  - Identify and instruct in appropriate isolation precautions for identified infection/condition  Outcome: Progressing

## 2024-02-01 ENCOUNTER — APPOINTMENT (OUTPATIENT)
Dept: PULMONOLOGY | Facility: CLINIC | Age: 69
End: 2024-02-01

## 2024-03-28 ENCOUNTER — APPOINTMENT (OUTPATIENT)
Dept: PULMONOLOGY | Facility: CLINIC | Age: 69
End: 2024-03-28
Payer: MEDICARE

## 2024-03-28 ENCOUNTER — LABORATORY RESULT (OUTPATIENT)
Age: 69
End: 2024-03-28

## 2024-03-28 VITALS
HEIGHT: 71 IN | HEART RATE: 64 BPM | DIASTOLIC BLOOD PRESSURE: 70 MMHG | BODY MASS INDEX: 25.2 KG/M2 | WEIGHT: 180 LBS | RESPIRATION RATE: 16 BRPM | SYSTOLIC BLOOD PRESSURE: 126 MMHG | OXYGEN SATURATION: 97 % | TEMPERATURE: 97.6 F

## 2024-03-28 DIAGNOSIS — Z86.39 PERSONAL HISTORY OF OTHER ENDOCRINE, NUTRITIONAL AND METABOLIC DISEASE: ICD-10-CM

## 2024-03-28 DIAGNOSIS — Z57.9 OCCUPATIONAL EXPOSURE TO UNSPECIFIED RISK FACTOR: ICD-10-CM

## 2024-03-28 DIAGNOSIS — Z87.19 PERSONAL HISTORY OF OTHER DISEASES OF THE DIGESTIVE SYSTEM: ICD-10-CM

## 2024-03-28 DIAGNOSIS — Z86.16 PERSONAL HISTORY OF COVID-19: ICD-10-CM

## 2024-03-28 DIAGNOSIS — Z78.9 OTHER SPECIFIED HEALTH STATUS: ICD-10-CM

## 2024-03-28 DIAGNOSIS — I25.10 ATHEROSCLEROTIC HEART DISEASE OF NATIVE CORONARY ARTERY W/OUT ANGINA PECTORIS: ICD-10-CM

## 2024-03-28 DIAGNOSIS — Z82.49 FAMILY HISTORY OF ISCHEMIC HEART DISEASE AND OTHER DISEASES OF THE CIRCULATORY SYSTEM: ICD-10-CM

## 2024-03-28 DIAGNOSIS — I78.1 NEVUS, NON-NEOPLASTIC: ICD-10-CM

## 2024-03-28 PROBLEM — Z00.00 ENCOUNTER FOR PREVENTIVE HEALTH EXAMINATION: Status: ACTIVE | Noted: 2024-03-28

## 2024-03-28 LAB
25(OH)D3 SERPL-MCNC: 36.8 NG/ML
A1AT SERPL-MCNC: 146 MG/DL
BASOPHILS # BLD AUTO: 0.06 K/UL
BASOPHILS NFR BLD AUTO: 0.8 %
EOSINOPHIL # BLD AUTO: 0.17 K/UL
EOSINOPHIL NFR BLD AUTO: 2.2 %
ERYTHROCYTE [SEDIMENTATION RATE] IN BLOOD BY WESTERGREN METHOD: 4 MM/HR
HCT VFR BLD CALC: 43.3 %
HGB BLD-MCNC: 14.2 G/DL
IMM GRANULOCYTES NFR BLD AUTO: 0.4 %
LYMPHOCYTES # BLD AUTO: 1.77 K/UL
LYMPHOCYTES NFR BLD AUTO: 22.9 %
MAN DIFF?: NORMAL
MCHC RBC-ENTMCNC: 28.9 PG
MCHC RBC-ENTMCNC: 32.8 GM/DL
MCV RBC AUTO: 88.2 FL
MONOCYTES # BLD AUTO: 0.48 K/UL
MONOCYTES NFR BLD AUTO: 6.2 %
NEUTROPHILS # BLD AUTO: 5.22 K/UL
NEUTROPHILS NFR BLD AUTO: 67.5 %
PLATELET # BLD AUTO: 188 K/UL
RBC # BLD: 4.91 M/UL
RBC # FLD: 13.2 %
RHEUMATOID FACT SER QL: <10 IU/ML
WBC # FLD AUTO: 7.73 K/UL

## 2024-03-28 PROCEDURE — 71046 X-RAY EXAM CHEST 2 VIEWS: CPT

## 2024-03-28 PROCEDURE — 99204 OFFICE O/P NEW MOD 45 MIN: CPT | Mod: 25

## 2024-03-28 PROCEDURE — 95012 NITRIC OXIDE EXP GAS DETER: CPT

## 2024-03-28 PROCEDURE — 94060 EVALUATION OF WHEEZING: CPT

## 2024-03-28 PROCEDURE — 94618 PULMONARY STRESS TESTING: CPT

## 2024-03-28 PROCEDURE — 94727 GAS DIL/WSHOT DETER LNG VOL: CPT

## 2024-03-28 PROCEDURE — 94729 DIFFUSING CAPACITY: CPT

## 2024-03-28 PROCEDURE — ZZZZZ: CPT

## 2024-03-28 RX ORDER — OLOPATADINE HYDROCHLORIDE 665 UG/1
0.6 SPRAY, METERED NASAL
Qty: 3 | Refills: 1 | Status: ACTIVE | COMMUNITY
Start: 2024-03-28 | End: 1900-01-01

## 2024-03-28 RX ORDER — PANTOPRAZOLE 40 MG/1
40 TABLET, DELAYED RELEASE ORAL
Refills: 0 | Status: ACTIVE | COMMUNITY

## 2024-03-28 RX ORDER — ATORVASTATIN CALCIUM 20 MG/1
20 TABLET, FILM COATED ORAL
Refills: 0 | Status: ACTIVE | COMMUNITY

## 2024-03-28 SDOH — HEALTH STABILITY - PHYSICAL HEALTH: OCCUPATIONAL EXPOSURE TO UNSPECIFIED RISK FACTOR: Z57.9
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
## 2024-03-28 NOTE — ADDENDUM
[FreeTextEntry1] : Documented by Kandace Cohn acting as a scribe for Dr. Bruce Tavares on 03/28/2024. All medical record entries made by the Scribe were at my, Dr. Bruce Tavares's, direction and personally dictated by me on 03/28/2024. I have reviewed the chart and agree that the record accurately reflects my personal performance of the history, physical exam, assessment and plan. I have also personally directed, reviewed, and agree with the discharge instructions.

## 2024-03-28 NOTE — REASON FOR VISIT
[Initial] : an initial visit [TextBox_44] : SOB, abnormal chest CT c/w early ILDz of ?etiology, allergies/PND, GERD, ?TONI

## 2024-03-28 NOTE — ASSESSMENT
[FreeTextEntry1] : Mr. DEAL is a 69 year old male with a history of HLD, CAD s/p coronary stents x2 (first 2018), GERD, telangiectasias s/p embolization 12/2023, COVID-19 (2022), occupational exposure in the workplace as an  for [employer] who now comes to the office for an initial pulmonary evaluation for SOB, abnormal chest CT c/w early ILDz of ?etiology, allergies/PND, GERD, ?TONI   His shortness of breath is multifactorial due to: -poor mechanics of breathing -out of shape -pulmonary disease   -ILDz   -asthma -cardiac disease   Problem 1: ILDz of ?etiology (?occupational exposure (asbestos) vs rheumatologic vs idiopathic) -full PFTs at next visit -complete HRCT of the chest 12/2024 -2nd opinion reading of chest CT from 12/2023 -complete blood work: full rheumatologic panel- hypersensitivity panel, ESR level, ACE level  -pending results of blood work and radiologic review, consider Ofev or esbriet -Etiology will depend on the causative agent possibilities include: idiopathic pulmonary fibrosis (UIP), NSIP (nonspecific interstitial pneumonia) , respiratory bronchiolitis in someone who is a smoker, drug induced lung disease, hypersensitivity pneumonitis, BOOP, sarcoidosis, chronic congestive heart failure, rheumatologic disorder induced interstitial lung disease. Optimal diagnosing will include rheumatological panel which would include ESR, KRISTIE, ANCA, ARNP, CCP, rheumatoid factor, hypersensitivity panel, JOE1, scleroderma antibodies, Sjogren's syndrome antibodies; biopsy will be necessary to definitively determine the etiology unless the CT scan findings are specific for UIP. Therapy will be based on diagnostics.   Problem 1A: Abnormal chest CT c/w ILDz -2nd opinion reading of chest CT from 12/2023 -complete HRCT of the chest 12/2024 -CAT scans are the only radiological modality to identify abnormalities within the lungs with regards to nodules/masses/lymph nodes. Risks, benefits were reviewed in detail. The guidelines for abnormalities include follow up CT scans at various intervals which could range from 6 weeks to 1 year intervals. If there is a change for the worse then consideration for a biopsy will be considered if the patient is a candidate. Second opinion evaluation with a thoracic surgeon or an interventional radiologist could be offered.   Problem 1B: Asthma -add trelegy 100 1 inhalation QD  -Asthma is believed to be caused by inherited (genetic) and environmental factor, but its exact cause is unknown. Asthma may be triggered by allergens, lung infections, or irritants in the air. Asthma triggers are different for each person.  -Inhaler technique reviewed as well as oral hygiene techniques reviewed with patient. Avoidance of cold air, extremes of temperature, rescue inhaler should be used before exercise. Order of medication reviewed with patient. Recommended adequate hydration and use of a cool mist humidifier in the bedroom    Problem 2: allergies/PND -add Olopatadine 0.6% 1 sniff BID  -complete blood work: asthma panel, food IgE panel, IgE level, eosinophil level, vitamin D level  -Environmental measures for allergies were encouraged including mattress and pillow covers, air purifier, and environmental controls.    Problem 3: GERD -continue Pantoprazole 40 mg QAM, pre-breakfast  -Rule of 2s: avoid eating too much, eating too late, eating too spicy, eating two hours before bed. -Things to avoid including overeating, spicy foods, tight clothing, eating within three hours of bed, this list is not all inclusive. -For treatment of reflux, possible options discussed including diet control, H2 blockers, PPIs, as well as coating motility agents discussed as treatment options. Timing of meals and proximity of last meal to sleep were discussed. If symptoms persist, a formal gastrointestinal evaluation is needed.    Problem 4: ?TONI (elevated Mallampati class, poor quality sleep, snoring, neck size >16) -complete home sleep study -Sleep apnea is associated with adverse clinical consequences which can affect most organ systems. Cardiovascular disease risk includes arrhythmias, atrial fibrillation, hypertension, coronary artery disease, and stroke. Metabolic disorders include diabetes type 2, non-alcoholic fatty liver disease. Mood disorder especially depression; and cognitive decline especially in the elderly. Associations with chronic reflux/Desai's esophagus some but not all inclusive. -Reasons include arousal consistent with hypopnea; respiratory events most prominent in REM sleep or supine position; therefore sleep staging and body position are important for accurate diagnosis and estimation of AHI.    Problem 5: cardiac disease (CAD s/p coronary stents x2) -recommended to continue to follow up with Cardiologist (Lisandra/Albertina)   Problem 6: poor breathing mechanics -Recommended Widarin Hof and Buteyko breathing technique -Proper breathing techniques were reviewed with an emphasis on exhalation. Patient was instructed to breathe in for 1 second and out for 4 seconds. The patient was encouraged to not talk while walking.   Problem 7: out of shape -Exercise, and diet control were discussed and are highly encouraged. Treatment options are given such as aqua therapy, and contacting a nutritionist. Recommended to use the elliptical, stationary bike, less use of the treadmill.   Problem 8: health maintenance -recommended yearly flu shot after October 15, 2023 -recommended strep pneumonia vaccines: Prevnar-20 vaccine, followed by Pneumo vaccine 23 one year following after 65 years old. -recommended early intervention for Upper Respiratory Infections (URIs) -recommended regular osteoporosis evaluations -recommended early dermatological evaluations -recommended after the age of 50 to the age of 70, colonoscopy every 5 years   F/P in 6-8 weeks. He is encouraged to call with any changes, concerns, or questions

## 2024-03-28 NOTE — PROCEDURE
[FreeTextEntry1] : CXR (03/28/2024) reveals a normal sized heart; mild scoliosis to the R, faint increased interstitial markings at the L base, calcified cartilage  6-minute walk test reveals a low saturation of 96% with no evidence of dyspnea or fatigue; walked 489 meters   Chest CT (12/21/2023) revealed enlarged aortic root, interval progression of interstitial markings in lower lobes, suggesting ILDz/fibrosis. Area of round atelectasis at the R lung base; coronary calcifications, dilated aortic root 3.7 cm   Full PFT reveals very mild obstructive dysfunction at mid-low lung volumes; FEV1 was 3.42L which is 99% of predicted, with a 10% improvement on BD; normal lung volumes; normal diffusion at 24.5, which is 114% of predicted; flattened inspiratory limb PFTs were performed to evaluate for SOB  FENO was 29; a normal value being less than 25 Fractional exhaled nitric oxide (FENO) is regarded as a simple, noninvasive method for assessing eosinophilic airway inflammation. Produced by a variety of cells within the lung, nitric oxide (NO) concentrations are generally low in healthy individuals. However, high concentrations of NO appear to be involved in nonspecific host defense mechanisms and chronic inflammatory diseases such as asthma. The American Thoracic Society (ATS) therefore has recommended using FENO to aid in the diagnosis and monitoring of eosinophilic airway inflammation and asthma, and for identifying steroid responsive individuals whose chronic respiratory symptoms may be caused by airway inflammation.

## 2024-03-29 LAB
ANA SER IF-ACNC: NEGATIVE
ANCA AB SER-IMP: NEGATIVE
C-ANCA SER-ACNC: NEGATIVE
CCP AB SER IA-ACNC: <8 UNITS
ENA JO1 AB SER IA-ACNC: <0.2 AL
ENA RNP AB SER IA-ACNC: 1.3 AL
ENA RNP AB SER IA-ACNC: 1.3 AL
ENA SCL70 IGG SER IA-ACNC: <0.2 AL
ENA SM AB SER IA-ACNC: <0.2 AL
ENA SS-A AB SER IA-ACNC: <0.2 AL
ENA SS-B AB SER IA-ACNC: <0.2 AL
P-ANCA TITR SER IF: NEGATIVE
RF+CCP IGG SER-IMP: NEGATIVE

## 2024-03-29 RX ORDER — FLUTICASONE FUROATE, UMECLIDINIUM BROMIDE AND VILANTEROL TRIFENATATE 100; 62.5; 25 UG/1; UG/1; UG/1
100-62.5-25 POWDER RESPIRATORY (INHALATION)
Qty: 1 | Refills: 3 | Status: ACTIVE | COMMUNITY
Start: 2024-03-28 | End: 1900-01-01

## 2024-03-30 LAB — 24R-OH-CALCIDIOL SERPL-MCNC: 61.1 PG/ML

## 2024-03-31 LAB
A ALTERNATA IGE QN: 0.58 KUA/L
A FUMIGATUS IGE QN: <0.1 KUA/L
C ALBICANS IGE QN: <0.1 KUA/L
C HERBARUM IGE QN: <0.1 KUA/L
CAT DANDER IGE QN: 0.14 KUA/L
COMMON RAGWEED IGE QN: 0.36 KUA/L
D FARINAE IGE QN: <0.1 KUA/L
D PTERONYSS IGE QN: <0.1 KUA/L
DEPRECATED A ALTERNATA IGE RAST QL: 1 (ref 0–?)
DEPRECATED A FUMIGATUS IGE RAST QL: 0 (ref 0–?)
DEPRECATED C ALBICANS IGE RAST QL: 0
DEPRECATED C HERBARUM IGE RAST QL: 0 (ref 0–?)
DEPRECATED CAT DANDER IGE RAST QL: NORMAL (ref 0–?)
DEPRECATED COMMON RAGWEED IGE RAST QL: 1 (ref 0–?)
DEPRECATED D FARINAE IGE RAST QL: 0 (ref 0–?)
DEPRECATED D PTERONYSS IGE RAST QL: 0 (ref 0–?)
DEPRECATED DOG DANDER IGE RAST QL: 0 (ref 0–?)
DEPRECATED DUCK FEATHER IGE RAST QL: 0
DEPRECATED GOOSE FEATHER IGE RAST QL: 0
DEPRECATED M RACEMOSUS IGE RAST QL: 0
DEPRECATED ROACH IGE RAST QL: 0 (ref 0–?)
DEPRECATED TIMOTHY IGE RAST QL: 1 (ref 0–?)
DEPRECATED WHITE OAK IGE RAST QL: 1 (ref 0–?)
DOG DANDER IGE QN: <0.1 KUA/L
DUCK FEATHER IGE QN: <0.1 KUA/L
GOOSE FEATHER IGE QN: <0.1 KUA/L
M RACEMOSUS IGE QN: <0.1 KUA/L
MITOCHONDRIA AB SER IF-ACNC: NORMAL
ROACH IGE QN: <0.1 KUA/L
TIMOTHY IGE QN: 0.42 KUA/L
WHITE OAK IGE QN: 0.37 KUA/L

## 2024-04-01 LAB
ALMOND IGE QN: <0.1 KUA/L
ALTERN TENCAPG(M6): 17.8 MCG/ML
ASPER FUMCAPG(M3): 76.7 MCG/ML
AUREOBASCAPG(M12): 30.8 MCG/ML
BRAZIL NUT IGE QN: <0.1 KUA/L
CASHEW NUT IGE QN: <0.1 KUA/L
CODFISH IGE QN: <0.1 KUA/L
COW MILK IGE QN: <0.1 KUA/L
DEPRECATED ALMOND IGE RAST QL: 0 (ref 0–?)
DEPRECATED BRAZIL NUT IGE RAST QL: 0 (ref 0–?)
DEPRECATED CASHEW NUT IGE RAST QL: 0 (ref 0–?)
DEPRECATED CODFISH IGE RAST QL: 0 (ref 0–?)
DEPRECATED COW MILK IGE RAST QL: 0 (ref 0–?)
DEPRECATED EGG WHITE IGE RAST QL: 0 (ref 0–?)
DEPRECATED HAZELNUT IGE RAST QL: NORMAL (ref 0–?)
DEPRECATED PEANUT IGE RAST QL: 0 (ref 0–?)
DEPRECATED SALMON IGE RAST QL: 0 (ref 0–?)
DEPRECATED SCALLOP IGE RAST QL: <0.1 KUA/L
DEPRECATED SESAME SEED IGE RAST QL: 0 (ref 0–?)
DEPRECATED SHRIMP IGE RAST QL: 0 (ref 0–?)
DEPRECATED SOYBEAN IGE RAST QL: 0 (ref 0–?)
DEPRECATED TUNA IGE RAST QL: 0 (ref 0–?)
DEPRECATED WALNUT IGE RAST QL: 0 (ref 0–?)
DEPRECATED WHEAT IGE RAST QL: 0 (ref 0–?)
EGG WHITE IGE QN: <0.1 KUA/L
HAZELNUT IGE QN: 0.26 KUA/L
LACEYELLA SACCHARI IGG: 7.5 MCG/ML
MICROPOLYCAPG(M22): 3.2 MCG/ML
PEANUT IGE QN: <0.1 KUA/L
PENIC CHRYCAPG(M1): 98.9 MCG/ML
PHOMA BETAE IGG: 11 MCG/ML
SALMON IGE QN: <0.1 KUA/L
SCALLOP IGE QN: 0 (ref 0–?)
SCALLOP IGE QN: <0.1 KUA/L
SESAME SEED IGE QN: <0.1 KUA/L
SOYBEAN IGE QN: <0.1 KUA/L
TOTAL IGE SMQN RAST: 35 KU/L
TRICHODERMA VIRIDE IGG: 36 MCG/ML
TUNA IGE QN: <0.1 KUA/L
WALNUT IGE QN: <0.1 KUA/L
WHEAT IGE QN: <0.1 KUA/L

## 2024-04-02 DIAGNOSIS — R89.9 UNSPECIFIED ABNORMAL FINDING IN SPECIMENS FROM OTHER ORGANS, SYSTEMS AND TISSUES: ICD-10-CM

## 2024-04-03 LAB
A1AT PHENOTYP SERPL-IMP: NORMAL
A1AT SERPL-MCNC: 147 MG/DL

## 2024-04-04 ENCOUNTER — NON-APPOINTMENT (OUTPATIENT)
Age: 69
End: 2024-04-04

## 2024-04-10 ENCOUNTER — APPOINTMENT (OUTPATIENT)
Dept: PEDIATRIC ALLERGY IMMUNOLOGY | Facility: CLINIC | Age: 69
End: 2024-04-10

## 2024-04-11 LAB
ANNOTATION COMMENT IMP: NORMAL
ELECTRONIC SIGNATURE: NORMAL
SERPINA1 GENE MUT TESTED BLD/T: NORMAL

## 2024-04-17 ENCOUNTER — APPOINTMENT (OUTPATIENT)
Dept: ALLERGY | Facility: CLINIC | Age: 69
End: 2024-04-17
Payer: MEDICARE

## 2024-04-17 ENCOUNTER — NON-APPOINTMENT (OUTPATIENT)
Age: 69
End: 2024-04-17

## 2024-04-17 VITALS
DIASTOLIC BLOOD PRESSURE: 80 MMHG | RESPIRATION RATE: 14 BRPM | HEART RATE: 72 BPM | OXYGEN SATURATION: 97 % | SYSTOLIC BLOOD PRESSURE: 135 MMHG | TEMPERATURE: 97.6 F

## 2024-04-17 PROCEDURE — 99204 OFFICE O/P NEW MOD 45 MIN: CPT | Mod: 25

## 2024-04-17 PROCEDURE — 95004 PERQ TESTS W/ALRGNC XTRCS: CPT
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
## 2024-04-17 NOTE — PHYSICAL EXAM
[Alert] : alert [Well Nourished] : well nourished [Healthy Appearance] : healthy appearance [No Acute Distress] : no acute distress [Well Developed] : well developed [Normal Voice/Communication] : normal voice communication [No Neck Mass] : no neck mass was observed [No LAD] : no lymphadenopathy [Normal Rate and Effort] : normal respiratory rhythm and effort [No Crackles] : no crackles [No Retractions] : no retractions [Bilateral Audible Breath Sounds] : bilateral audible breath sounds [Normal Rate] : heart rate was normal  [Normal S1, S2] : normal S1 and S2 [No murmur] : no murmur [Regular Rhythm] : with a regular rhythm [No Rash] : no rash [No clubbing] : no clubbing [No Cyanosis] : no cyanosis [Normal Mood] : mood was normal [Wheezing] : no wheezing was heard
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
## 2024-04-17 NOTE — REVIEW OF SYSTEMS
[Nl] : Genitourinary [FreeTextEntry7] : GI bleed - telengiectasia - 9 units of blood  [FreeTextEntry5] : 2 stents

## 2024-04-17 NOTE — SOCIAL HISTORY
[House] : [unfilled] lives in a house  [Central Forced Air] : heating provided by central forced air [Central] : air conditioning provided by central unit [None] : none [] :  [FreeTextEntry1] : Lives with spouse Retired  [Bedroom] : not in the bedroom [Living Area] : not in the living area [Smokers in Household] : there are no smokers in the home

## 2024-04-21 LAB
EJ AB SER QL: NEGATIVE
ENA JO1 AB SER IA-ACNC: <20 UNITS
ENA PM/SCL AB SER-ACNC: <20 UNITS
ENA SM+RNP AB SER IA-ACNC: <20 UNITS
ENA SS-A IGG SER QL: <20 UNITS
FIBRILLARIN AB SER QL: NEGATIVE
KU AB SER QL: NEGATIVE
MDA-5 (P140)(CADM-140): <20 UNITS
MI2 AB SER QL: NEGATIVE
NXP-2 (P140): <20 UNITS
OJ AB SER QL: ABNORMAL
PL12 AB SER QL: ABNORMAL
PL7 AB SER QL: NEGATIVE
SRP AB SERPL QL: NEGATIVE
TIF GAMMA (P155/140): <20 UNITS
U2 SNRNP AB SER QL: NEGATIVE

## 2024-04-23 ENCOUNTER — APPOINTMENT (OUTPATIENT)
Dept: SLEEP CENTER | Facility: CLINIC | Age: 69
End: 2024-04-23
Payer: MEDICARE

## 2024-04-23 ENCOUNTER — OUTPATIENT (OUTPATIENT)
Dept: OUTPATIENT SERVICES | Facility: HOSPITAL | Age: 69
LOS: 1 days | End: 2024-04-23
Payer: MEDICARE

## 2024-04-23 PROCEDURE — 95800 SLP STDY UNATTENDED: CPT | Mod: 26

## 2024-04-23 PROCEDURE — 95800 SLP STDY UNATTENDED: CPT

## 2024-04-25 ENCOUNTER — APPOINTMENT (OUTPATIENT)
Dept: PULMONOLOGY | Facility: CLINIC | Age: 69
End: 2024-04-25
Payer: MEDICARE

## 2024-04-25 PROCEDURE — 99443: CPT | Mod: 93

## 2024-04-25 NOTE — REASON FOR VISIT
[Home] : at home, [unfilled] , at the time of the visit. [Medical Office: ([facility])___] : at the medical office located in  [Patient] : the patient [Self] : self [This encounter was initiated by telehealth (audio with video) and converted to telephone (audio only) due to technical difficulties.] : This encounter was initiated by telehealth (audio with video) and converted to telephone (audio only) due to technical difficulties. [Follow-Up] : a follow-up visit [Sleep Apnea] : sleep apnea [TextBox_44] : MILIND DEAL is being seen for sleep study results

## 2024-04-25 NOTE — DISCUSSION/SUMMARY
[FreeTextEntry1] : Sleep study done 4/23/24 Recording Time: 13.6 hrs Total sleep time: 8h 5m  AHI: 13.2 KARLEY: 12.4  The study shows mild obstructive sleep apnea associated with mild-moderate O2 desaturation

## 2024-04-25 NOTE — HISTORY OF PRESENT ILLNESS
[TextBox_4] : Mr. 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 His chief complaint is:  -reports he is in his normal state of health, no new complaints or recent illness -reports he occasionally snores -reports he has had trouble sleeping -reports he sleeps on his back with his mouth open, wakes up with a dry mouth  -denies pulmonary issues

## 2024-04-25 NOTE — ASSESSMENT
[FreeTextEntry1] : Mr. DEAL is a 69 year old male with a history of HLD, CAD s/p coronary stents x2 (first 2018), GERD, telangiectasias s/p embolization 12/2023, COVID-19 (2022), occupational exposure in the workplace as an  for [email or web address] presenting to the office via telephonic visit for HSS results   Discussed treatment option of Oral appliance. Explained these are safe, noninvasive and highly effective for treating snoring and varying severities of obstructive sleep apnea. The oral devices are designed to position the lower jaw slightly forward and down. This opens the airway. These devices are simple, portable, and silent, but they can be almost as expensive as CPAP. This option is not as suitable with those that have moderate to severe sleep apnea as well as central or complex sleep apnea. I did discuss the need for a follow up sleep study once the oral appliance was made and fit for comfort to actively assess how well it is treating the condition.  I have discussed all the negative health consequences associated with obstructive and central sleep apnea including heart conditions/MI, hypertension, diabetes, chronic inflammation, memory issues, stroke, obesity, decreased libido, sleep related accidents, as well as anxiety and depression. - Additional recommendations included: Avoid alcohol and sedatives at bedtime. Proper sleep hygiene such as maintaining a regular sleep routine, avoiding naps if possible, not watching TV or reading in bed, and maintaining a quiet, comfortable bedroom. Sleepy driving avoidance and risks discussed with patient. - Diet, exercise and weight loss suggested. - Dental supply RX, list of DDS, and HST report to be mailed to patient's home.  Patient would like to think about getting the DD he has an appointment on 5/28/24 he will discuss again  Encouraged to call with any questions/concerns

## 2024-04-26 DIAGNOSIS — G47.33 OBSTRUCTIVE SLEEP APNEA (ADULT) (PEDIATRIC): ICD-10-CM

## 2024-05-22 ENCOUNTER — APPOINTMENT (OUTPATIENT)
Dept: RHEUMATOLOGY | Facility: CLINIC | Age: 69
End: 2024-05-22
Payer: MEDICARE

## 2024-05-22 VITALS
OXYGEN SATURATION: 98 % | TEMPERATURE: 98.1 F | DIASTOLIC BLOOD PRESSURE: 75 MMHG | HEART RATE: 73 BPM | SYSTOLIC BLOOD PRESSURE: 117 MMHG | RESPIRATION RATE: 16 BRPM | BODY MASS INDEX: 25.2 KG/M2 | HEIGHT: 71 IN | WEIGHT: 180 LBS

## 2024-05-22 PROCEDURE — 99204 OFFICE O/P NEW MOD 45 MIN: CPT

## 2024-05-24 ENCOUNTER — APPOINTMENT (OUTPATIENT)
Dept: CT IMAGING | Facility: CLINIC | Age: 69
End: 2024-05-24
Payer: MEDICARE

## 2024-05-24 ENCOUNTER — OUTPATIENT (OUTPATIENT)
Dept: OUTPATIENT SERVICES | Facility: HOSPITAL | Age: 69
LOS: 1 days | End: 2024-05-24
Payer: MEDICARE

## 2024-05-24 DIAGNOSIS — J84.9 INTERSTITIAL PULMONARY DISEASE, UNSPECIFIED: ICD-10-CM

## 2024-05-24 PROCEDURE — 71250 CT THORAX DX C-: CPT | Mod: 26,MH

## 2024-05-24 PROCEDURE — 71250 CT THORAX DX C-: CPT

## 2024-05-27 RX ORDER — BIOTIN 10 MG
TABLET ORAL
Refills: 0 | Status: ACTIVE | COMMUNITY

## 2024-05-27 RX ORDER — UBIQUINOL 100 MG
CAPSULE ORAL
Refills: 0 | Status: ACTIVE | COMMUNITY

## 2024-05-27 RX ORDER — VITAMIN E ACID SUCCINATE 268 MG
TABLET ORAL
Refills: 0 | Status: ACTIVE | COMMUNITY

## 2024-05-27 NOTE — ASSESSMENT
[FreeTextEntry1] : 69 year old male with possible ILD and abnormal weak positive RNP, OJ and PL7 antibodies here for evaluation   1. 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 His PFTs are normal and he has no significant symptomatic ILD. His serologies are weak positive and he does not have any other symptoms or exam findings to suggest underlying autoimmune disease. It is possible that the serologies are false positive considering their titers. Will send a repeat myomarker panel to [facility] for confirmation. Also, advised to repeat an HRCT to evaluate for extent of ILD.    Follow up in 2 months
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
## 2024-05-27 NOTE — REVIEW OF SYSTEMS
[Cough] : cough [SOB on Exertion] : shortness of breath during exertion [As Noted in HPI] : as noted in HPI [Arthralgias] : arthralgias [Negative] : Heme/Lymph [Shortness Of Breath] : no shortness of breath [Wheezing] : no wheezing [Orthopnea] : no orthopnea [PND] : no PND [Joint Pain] : no joint pain [Joint Swelling] : no joint swelling [Joint Stiffness] : no joint stiffness [Limb Pain] : no limb pain [Limb Swelling] : no limb swelling

## 2024-05-28 ENCOUNTER — APPOINTMENT (OUTPATIENT)
Dept: PULMONOLOGY | Facility: CLINIC | Age: 69
End: 2024-05-28
Payer: MEDICARE

## 2024-05-28 VITALS
WEIGHT: 180 LBS | RESPIRATION RATE: 16 BRPM | BODY MASS INDEX: 25.2 KG/M2 | DIASTOLIC BLOOD PRESSURE: 80 MMHG | HEART RATE: 63 BPM | HEIGHT: 71 IN | OXYGEN SATURATION: 96 % | SYSTOLIC BLOOD PRESSURE: 120 MMHG | TEMPERATURE: 97.9 F

## 2024-05-28 DIAGNOSIS — R06.83 SNORING: ICD-10-CM

## 2024-05-28 DIAGNOSIS — Z57.9 OCCUPATIONAL EXPOSURE TO UNSPECIFIED RISK FACTOR: ICD-10-CM

## 2024-05-28 DIAGNOSIS — K21.9 GASTRO-ESOPHAGEAL REFLUX DISEASE W/OUT ESOPHAGITIS: ICD-10-CM

## 2024-05-28 DIAGNOSIS — J84.9 INTERSTITIAL PULMONARY DISEASE, UNSPECIFIED: ICD-10-CM

## 2024-05-28 DIAGNOSIS — R09.82 POSTNASAL DRIP: ICD-10-CM

## 2024-05-28 DIAGNOSIS — J45.30 MILD PERSISTENT ASTHMA, UNCOMPLICATED: ICD-10-CM

## 2024-05-28 DIAGNOSIS — R06.02 SHORTNESS OF BREATH: ICD-10-CM

## 2024-05-28 LAB
CK SERPL-CCNC: 81 U/L
MYELOPEROXIDASE AB SER QL IA: <5 UNITS
MYELOPEROXIDASE CELLS FLD QL: NEGATIVE
PROTEINASE3 AB SER IA-ACNC: <5 UNITS
PROTEINASE3 AB SER-ACNC: NEGATIVE

## 2024-05-28 PROCEDURE — ZZZZZ: CPT

## 2024-05-28 PROCEDURE — 94010 BREATHING CAPACITY TEST: CPT

## 2024-05-28 PROCEDURE — 99214 OFFICE O/P EST MOD 30 MIN: CPT | Mod: 25

## 2024-05-28 PROCEDURE — 94729 DIFFUSING CAPACITY: CPT

## 2024-05-28 PROCEDURE — 99204 OFFICE O/P NEW MOD 45 MIN: CPT | Mod: 25

## 2024-05-28 PROCEDURE — 94727 GAS DIL/WSHOT DETER LNG VOL: CPT

## 2024-05-28 PROCEDURE — 94618 PULMONARY STRESS TESTING: CPT

## 2024-05-28 PROCEDURE — 95012 NITRIC OXIDE EXP GAS DETER: CPT

## 2024-05-28 SDOH — HEALTH STABILITY - PHYSICAL HEALTH: OCCUPATIONAL EXPOSURE TO UNSPECIFIED RISK FACTOR: Z57.9

## 2024-05-28 NOTE — RESULTS/DATA
[TextEntry] : CT Angio chest (3/28/2024) reveals interval progression of interstitial markings in the lower lobes bilaterally, suggesting interstitial lung disease/fibrosis. Are of linear atelectasis notes at the right lung base posterity with a subpleural nodular component probably representing a small region of concomitant round atelectasis. Mild COPD, calcific atherosclerosis, Dilatation of the aortic root to 3.7 cm, unchanged.

## 2024-05-28 NOTE — PROCEDURE
[FreeTextEntry1] : April 2024: Sleep study done  AHI: 13.2  CXR (03/28/2024) reveals a normal sized heart; mild scoliosis to the R, faint increased interstitial markings at the L base, calcified cartilage  6-minute walk test reveals a low saturation of 96% with no evidence of dyspnea or fatigue; walked 489 meters  Chest CT (12/21/2023) revealed enlarged aortic root, interval progression of interstitial markings in lower lobes, suggesting ILDz/fibrosis. Area of round atelectasis at the R lung base; coronary calcifications, dilated aortic root 3.7 cm  MAR 2024  FEV1/FVC 69 FEV1 3.42 (99) FVC 4.98 (114) TLC 7.33 (108) DLCO 24.5 (114) Post BD FEV1/FVC 70 FEV1 3.78 (110) 10% change. Mild obstruction with BD response  Mar 2024  FENO was 29; a normal value being less than 25 Fractional exhaled nitric oxide (FENO) is regarded as a simple, noninvasive method for assessing eosinophilic airway inflammation. Produced by a variety of cells within the lung, nitric oxide (NO) concentrations are generally low in healthy individuals. However, high concentrations of NO appear to be involved in nonspecific host defense mechanisms and chronic inflammatory diseases such as asthma. The American Thoracic Society (ATS) therefore has recommended using FENO to aid in the diagnosis and monitoring of eosinophilic airway inflammation and asthma, and for identifying steroid responsive individuals whose chronic respiratory symptoms may be caused by airway inflammation.   Dec 2023 : CT chest w/Contrast (Comparison 11/18/ 2019) Impression: Interval progression of interstitial markings in the lower lobes bilaterally suggesting interstitial lung disease/ fibrosis Area of linear atelectasis noted at the right lung base posteriorly with a subpleural nodular component probably representing a small region of concomitant round atelectasis Mild COPD Calcific atherosclerosis Dilatrion of the aortic root to 3.7 cm unchanged

## 2024-05-28 NOTE — ASSESSMENT
[FreeTextEntry1] : Mr. 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 HP   -asthma -cardiac disease   Problem 1: ILDz of ?etiology (?occupational exposure (asbestos) vs rheumatologic vs idiopathic)- ?HP -s/p HRCT of the chest 5/2024 pending result -2nd opinion reading of chest CT from 12/2023 -s/p  blood work: full rheumatologic panel- hypersensitivity panel, ESR level, ACE level (+) -pending results of blood work and radiologic review, consider Ofev or esbriet (namin 5/2024), Boxer- 3/2024 (no Rx) -Etiology will depend on the causative agent possibilities include: idiopathic pulmonary fibrosis (UIP), NSIP (nonspecific interstitial pneumonia) , respiratory bronchiolitis in someone who is a smoker, drug induced lung disease, hypersensitivity pneumonitis, BOOP, sarcoidosis, chronic congestive heart failure, rheumatologic disorder induced interstitial lung disease. Optimal diagnosing will include rheumatological panel which would include ESR, KRISTIE, ANCA, ARNP, CCP, rheumatoid factor, hypersensitivity panel, JOE1, scleroderma antibodies, Sjogren's syndrome antibodies; biopsy will be necessary to definitively determine the etiology unless the CT scan findings are specific for UIP. Therapy will be based on diagnostics.   Problem 1A: Abnormal chest CT c/w ILDz -2nd opinion reading of chest CT from 12/2023 -complete HRCT of the chest 5/2024 -CAT scans are the only radiological modality to identify abnormalities within the lungs with regards to nodules/masses/lymph nodes. Risks, benefits were reviewed in detail. The guidelines for abnormalities include follow up CT scans at various intervals which could range from 6 weeks to 1 year intervals. If there is a change for the worse then consideration for a biopsy will be considered if the patient is a candidate. Second opinion evaluation with a thoracic surgeon or an interventional radiologist could be offered.   Problem 1B: Asthma -trelegy 100 1 inhalation QD  -Asthma is believed to be caused by inherited (genetic) and environmental factor, but its exact cause is unknown. Asthma may be triggered by allergens, lung infections, or irritants in the air. Asthma triggers are different for each person.  -Inhaler technique reviewed as well as oral hygiene techniques reviewed with patient. Avoidance of cold air, extremes of temperature, rescue inhaler should be used before exercise. Order of medication reviewed with patient. Recommended adequate hydration and use of a cool mist humidifier in the bedroom    Problem 2: allergies/PND -add Olopatadine 0.6% 1 sniff BID (+) -s/p  blood work: asthma panel, food IgE panel, IgE level, eosinophil level, vitamin D level (WNL) -Environmental measures for allergies were encouraged including mattress and pillow covers, air purifier, and environmental controls.    Problem 3: GERD -continue Pantoprazole 40 mg QAM, pre-breakfast  -Rule of 2s: avoid eating too much, eating too late, eating too spicy, eating two hours before bed. -Things to avoid including overeating, spicy foods, tight clothing, eating within three hours of bed, this list is not all inclusive. -For treatment of reflux, possible options discussed including diet control, H2 blockers, PPIs, as well as coating motility agents discussed as treatment options. Timing of meals and proximity of last meal to sleep were discussed. If symptoms persist, a formal gastrointestinal evaluation is needed.    Problem 4: (+) TONI (elevated Mallampati class, poor quality sleep, snoring, neck size >16)-mild -s/p home sleep study -recommend DD mouth piece -Sleep apnea is associated with adverse clinical consequences which can affect most organ systems. Cardiovascular disease risk includes arrhythmias, atrial fibrillation, hypertension, coronary artery disease, and stroke. Metabolic disorders include diabetes type 2, non-alcoholic fatty liver disease. Mood disorder especially depression; and cognitive decline especially in the elderly. Associations with chronic reflux/Desai's esophagus some but not all inclusive. -Reasons include arousal consistent with hypopnea; respiratory events most prominent in REM sleep or supine position; therefore sleep staging and body position are important for accurate diagnosis and estimation of AHI.    Problem 5: cardiac disease (CAD s/p coronary stents x2) -recommended to continue to follow up with Cardiologist (Lisandra/Albertina)   Problem 6: poor breathing mechanics -Recommended Anderson Diehl and Yaa breathing technique -Proper breathing techniques were reviewed with an emphasis on exhalation. Patient was instructed to breathe in for 1 second and out for 4 seconds. The patient was encouraged to not talk while walking.   Problem 7: out of shape -Exercise, and diet control were discussed and are highly encouraged. Treatment options are given such as aqua therapy, and contacting a nutritionist. Recommended to use the elliptical, stationary bike, less use of the treadmill.   Problem 8: health maintenance -recommended yearly flu shot after October 15, 2023 -recommended strep pneumonia vaccines: Prevnar-20 vaccine, followed by Pneumo vaccine 23 one year following after 65 years old. -recommended early intervention for Upper Respiratory Infections (URIs) -recommended regular osteoporosis evaluations -recommended early dermatological evaluations -recommended after the age of 50 to the age of 70, colonoscopy every 5 years   F/P in 3-6 months He is encouraged to call with any changes, concerns, or questions

## 2024-05-28 NOTE — ADDENDUM
[FreeTextEntry1] :  Documented by Estee Paige acting as a scribe for Dr. Bruce Tavares on 05/28/2024 .   All medical record entries made by the Scribe were at my, Dr. Bruce Tavares's direction and personally dictated by me on 05/28/2024 . I have reviewed the chart and agree that the record accurately reflects my personal performance of the history, Physical exam, assessment, and plan. I have also personally directed, reviewed, and agree with the discharge instructions.

## 2024-05-28 NOTE — HISTORY OF PRESENT ILLNESS
[TextBox_4] : Mr. DEAL is a 69 year old male with a history of HLD, CAD s/p coronary stents x2 (first 2018), GERD, telangiectasias s/p embolization 12/2023, COVID-19 (2022), occupational exposure in the workplace as an  for [employer] presenting to the office today for an f/u pulmonary evaluation. His chief complaint is  -he notes some soft stool -he notes running a bit when he is boating -he notes his weight is stable  he notes that his appetite is good.. -he notes some tingling in his hands and feet -he notes his biggest complaints was his heart disease, and GI issues -he notes needing new hips but it keeps getting pushed back   -Patient denies any headaches, nausea, vomiting, fever, chills, sweats, chest pain, chest pressure, palpitations, coughing, wheezing, fatigue, diarrhea, constipation, dysphagia, arthralgias, myalgias, dizziness, leg swelling, leg pain, itchy eyes, itchy ears, dysphonia, heartburn, reflux or sour taste in mouth.

## 2024-05-28 NOTE — PROCEDURE
[FreeTextEntry1] :  Full PFT reveals normal flows; FEV1 was 4.08 L which is 119 % of predicted, normal lung volumes, mildly reduced diffusions, at 15 L which is 69% predicted, abnormal inspiratory limb. PFT's for performed to evaluate for SOB.    Six Minute Walk test reveals a low saturation of 96% with no evidence of Dyspnea or Fatigue; walked 472.7 meters.   FENO was 10; a normal value being less than 25. Fractional exhaled nitric oxide (FENO) is regarded as a simple, noninvasive method for assessing eosinophilic airway inflammation. Produced by a variety of cells within the lung, nitric oxide (NO) concentrations are generally low in healthy individuals. However, high concentrations of NO appear to be involved in nonspecific host defense mechanisms and chronic inflammatory diseases such as asthma. The American Thoracic Society (ATS) therefore has strongly recommended using FENO to aid in the assessment, management, and long-term monitoring of eosinophilic airway inflammation and asthma, and for identifying steroid responsive individuals whose chronic respiratory symptoms may be caused by airway inflammation. In their 2011 clinical practice guideline, the ATS emphasizes the importance of using FENO.

## 2024-05-28 NOTE — ASSESSMENT
[FreeTextEntry1] : Mr. 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 (?occupational exposure (asbestos) vs rheumatologic vs idiopathic) -full PFTs at next visit -complete HRCT of the chest 12/2024 -2nd opinion reading of chest CT from 12/2023 -complete blood work: full rheumatologic panel- hypersensitivity panel, ESR level, ACE level -pending results of blood work and radiologic review, consider Ofev or esbriet -Etiology will depend on the causative agent possibilities include: idiopathic pulmonary fibrosis (UIP), NSIP (nonspecific interstitial pneumonia) , respiratory bronchiolitis in someone who is a smoker, drug induced lung disease, hypersensitivity pneumonitis, BOOP, sarcoidosis, chronic congestive heart failure, rheumatologic disorder induced interstitial lung disease. Optimal diagnosing will include rheumatological panel which would include ESR, KRISTIE, ANCA, ARNP, CCP, rheumatoid factor, hypersensitivity panel, JOE1, scleroderma antibodies, Sjogren's syndrome antibodies; biopsy will be necessary to definitively determine the etiology unless the CT scan findings are specific for UIP. Therapy will be based on diagnostics.  Problem 1A: Abnormal chest CT c/w ILDz -2nd opinion reading of chest CT from 12/2023 -complete HRCT of the chest 12/2024 -CAT scans are the only radiological modality to identify abnormalities within the lungs with regards to nodules/masses/lymph nodes. Risks, benefits were reviewed in detail. The guidelines for abnormalities include follow up CT scans at various intervals which could range from 6 weeks to 1 year intervals. If there is a change for the worse then consideration for a biopsy will be considered if the patient is a candidate. Second opinion evaluation with a thoracic surgeon or an interventional radiologist could be offered.  Problem 1B: Asthma -add trelegy 100 1 inhalation QD -Asthma is believed to be caused by inherited (genetic) and environmental factor, but its exact cause is unknown. Asthma may be triggered by allergens, lung infections, or irritants in the air. Asthma triggers are different for each person. -Inhaler technique reviewed as well as oral hygiene techniques reviewed with patient. Avoidance of cold air, extremes of temperature, rescue inhaler should be used before exercise. Order of medication reviewed with patient. Recommended adequate hydration and use of a cool mist humidifier in the bedroom  Problem 2: allergies/PND -add Olopatadine 0.6% 1 sniff BID -complete blood work: asthma panel, food IgE panel, IgE level, eosinophil level, vitamin D level -Environmental measures for allergies were encouraged including mattress and pillow covers, air purifier, and environmental controls.  Problem 3: GERD -continue Pantoprazole 40 mg QAM, pre-breakfast -Rule of 2s: avoid eating too much, eating too late, eating too spicy, eating two hours before bed. -Things to avoid including overeating, spicy foods, tight clothing, eating within three hours of bed, this list is not all inclusive. -For treatment of reflux, possible options discussed including diet control, H2 blockers, PPIs, as well as coating motility agents discussed as treatment options. Timing of meals and proximity of last meal to sleep were discussed. If symptoms persist, a formal gastrointestinal evaluation is needed.  Problem 4: ?TONI (elevated Mallampati class, poor quality sleep, snoring, neck size >16) -complete home sleep study -Sleep apnea is associated with adverse clinical consequences which can affect most organ systems. Cardiovascular disease risk includes arrhythmias, atrial fibrillation, hypertension, coronary artery disease, and stroke. Metabolic disorders include diabetes type 2, non-alcoholic fatty liver disease. Mood disorder especially depression; and cognitive decline especially in the elderly. Associations with chronic reflux/Desai's esophagus some but not all inclusive. -Reasons include arousal consistent with hypopnea; respiratory events most prominent in REM sleep or supine position; therefore sleep staging and body position are important for accurate diagnosis and estimation of AHI.  Problem 5: cardiac disease (CAD s/p coronary stents x2) -recommended to continue to follow up with Cardiologist (Lisandra/Albertina)  Problem 6: poor breathing mechanics -Recommended Widarin Hof and Buteyko breathing technique -Proper breathing techniques were reviewed with an emphasis on exhalation. Patient was instructed to breathe in for 1 second and out for 4 seconds. The patient was encouraged to not talk while walking.  Problem 7: out of shape -Exercise, and diet control were discussed and are highly encouraged. Treatment options are given such as aqua therapy, and contacting a nutritionist. Recommended to use the elliptical, stationary bike, less use of the treadmill.  Problem 8: health maintenance -recommended yearly flu shot after October 15, 2023 -recommended strep pneumonia vaccines: Prevnar-20 vaccine, followed by Pneumo vaccine 23 one year following after 65 years old. -recommended early intervention for Upper Respiratory Infections (URIs) -recommended regular osteoporosis evaluations -recommended early dermatological evaluations -recommended after the age of 50 to the age of 70, colonoscopy every 5 years  F/P in 6-8 weeks. He is encouraged to call with any changes, concerns, or questions.

## 2024-05-28 NOTE — REASON FOR VISIT
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

## 2024-05-28 NOTE — HISTORY OF PRESENT ILLNESS
[TextBox_4] : Mr. DEAL is a 69 year old male with a history of HLD, CAD s/p coronary stents x2 (first 2018), GERD, telangiectasias s/p embolization 12/2023, COVID-19 (2022), occupational exposure in the workplace as an  for [employer]   +myomarker paneel -oral appliance -Positive HP panel-     Encouraged to call with any questions/concerns.

## 2024-05-31 LAB
CENP-A: <11 SI
CENP-B: <11 SI
FIBRILLARIN: <11 SI
PM/SCL-100: <11 SI
PM/SCL-75: <11 SI
RP11: <11 SI
RP155: <11 SI
SCL-70: <11 SI
TH/TO: <11 SI
U1-SNRNP RNP A: <11 SI
U1-SNRNP RNP C: <11 SI
U1-SNRNP RNP-70KD: <11 SI

## 2024-06-05 DIAGNOSIS — R05.8 OTHER SPECIFIED COUGH: ICD-10-CM

## 2024-06-05 RX ORDER — MUCUS CLEARING DEVICE
EACH MISCELLANEOUS
Qty: 1 | Refills: 0 | Status: ACTIVE | COMMUNITY
Start: 2024-06-05 | End: 1900-01-01

## 2024-06-27 LAB — RHODAMINE-AURAMINE STN SPEC: NORMAL

## 2024-06-28 ENCOUNTER — TRANSCRIPTION ENCOUNTER (OUTPATIENT)
Age: 69
End: 2024-06-28

## 2024-06-30 LAB
RHODAMINE-AURAMINE STN SPEC: NORMAL
RHODAMINE-AURAMINE STN SPEC: NORMAL

## 2024-07-16 ENCOUNTER — APPOINTMENT (OUTPATIENT)
Dept: RHEUMATOLOGY | Facility: CLINIC | Age: 69
End: 2024-07-16
Payer: MEDICARE

## 2024-07-16 VITALS
WEIGHT: 174 LBS | DIASTOLIC BLOOD PRESSURE: 72 MMHG | HEART RATE: 67 BPM | SYSTOLIC BLOOD PRESSURE: 110 MMHG | RESPIRATION RATE: 16 BRPM | OXYGEN SATURATION: 98 % | TEMPERATURE: 98.1 F | HEIGHT: 71 IN | BODY MASS INDEX: 24.36 KG/M2

## 2024-07-16 PROCEDURE — 99213 OFFICE O/P EST LOW 20 MIN: CPT

## 2024-08-04 ENCOUNTER — NON-APPOINTMENT (OUTPATIENT)
Age: 69
End: 2024-08-04

## 2024-10-15 ENCOUNTER — APPOINTMENT (OUTPATIENT)
Dept: RADIOLOGY | Facility: HOSPITAL | Age: 69
End: 2024-10-15

## 2024-10-29 ENCOUNTER — APPOINTMENT (OUTPATIENT)
Dept: PULMONOLOGY | Facility: CLINIC | Age: 69
End: 2024-10-29
Payer: MEDICARE

## 2024-10-29 VITALS
WEIGHT: 176 LBS | HEIGHT: 71 IN | SYSTOLIC BLOOD PRESSURE: 136 MMHG | BODY MASS INDEX: 24.64 KG/M2 | OXYGEN SATURATION: 99 % | RESPIRATION RATE: 18 BRPM | HEART RATE: 67 BPM | TEMPERATURE: 97.2 F | DIASTOLIC BLOOD PRESSURE: 70 MMHG

## 2024-10-29 DIAGNOSIS — Z57.9 OCCUPATIONAL EXPOSURE TO UNSPECIFIED RISK FACTOR: ICD-10-CM

## 2024-10-29 DIAGNOSIS — K21.9 GASTRO-ESOPHAGEAL REFLUX DISEASE W/OUT ESOPHAGITIS: ICD-10-CM

## 2024-10-29 DIAGNOSIS — R06.02 SHORTNESS OF BREATH: ICD-10-CM

## 2024-10-29 DIAGNOSIS — G47.33 OBSTRUCTIVE SLEEP APNEA (ADULT) (PEDIATRIC): ICD-10-CM

## 2024-10-29 DIAGNOSIS — J45.30 MILD PERSISTENT ASTHMA, UNCOMPLICATED: ICD-10-CM

## 2024-10-29 DIAGNOSIS — R09.82 POSTNASAL DRIP: ICD-10-CM

## 2024-10-29 DIAGNOSIS — J84.9 INTERSTITIAL PULMONARY DISEASE, UNSPECIFIED: ICD-10-CM

## 2024-10-29 DIAGNOSIS — R06.83 SNORING: ICD-10-CM

## 2024-10-29 PROCEDURE — 94618 PULMONARY STRESS TESTING: CPT

## 2024-10-29 PROCEDURE — ZZZZZ: CPT

## 2024-10-29 PROCEDURE — 99214 OFFICE O/P EST MOD 30 MIN: CPT | Mod: 25

## 2024-10-29 PROCEDURE — 94010 BREATHING CAPACITY TEST: CPT

## 2024-10-29 PROCEDURE — 94729 DIFFUSING CAPACITY: CPT

## 2024-10-29 PROCEDURE — 94727 GAS DIL/WSHOT DETER LNG VOL: CPT

## 2024-10-29 PROCEDURE — 95012 NITRIC OXIDE EXP GAS DETER: CPT

## 2024-10-29 SDOH — HEALTH STABILITY - PHYSICAL HEALTH: OCCUPATIONAL EXPOSURE TO UNSPECIFIED RISK FACTOR: Z57.9

## 2025-01-09 DIAGNOSIS — Z00.00 ENCOUNTER FOR GENERAL ADULT MEDICAL EXAMINATION W/OUT ABNORMAL FINDINGS: ICD-10-CM

## 2025-01-10 ENCOUNTER — OUTPATIENT (OUTPATIENT)
Dept: OUTPATIENT SERVICES | Facility: HOSPITAL | Age: 70
LOS: 1 days | End: 2025-01-10
Payer: MEDICARE

## 2025-01-10 ENCOUNTER — RESULT REVIEW (OUTPATIENT)
Age: 70
End: 2025-01-10

## 2025-01-10 ENCOUNTER — APPOINTMENT (OUTPATIENT)
Dept: ORTHOPEDIC SURGERY | Facility: CLINIC | Age: 70
End: 2025-01-10
Payer: MEDICARE

## 2025-01-10 ENCOUNTER — APPOINTMENT (OUTPATIENT)
Dept: RADIOLOGY | Facility: CLINIC | Age: 70
End: 2025-01-10
Payer: MEDICARE

## 2025-01-10 VITALS — HEIGHT: 71 IN | BODY MASS INDEX: 25.2 KG/M2 | WEIGHT: 180 LBS

## 2025-01-10 DIAGNOSIS — M16.0 BILATERAL PRIMARY OSTEOARTHRITIS OF HIP: ICD-10-CM

## 2025-01-10 PROCEDURE — 73525 CONTRAST X-RAY OF HIP: CPT

## 2025-01-10 PROCEDURE — 99204 OFFICE O/P NEW MOD 45 MIN: CPT

## 2025-01-10 PROCEDURE — 73525 CONTRAST X-RAY OF HIP: CPT | Mod: 26,RT

## 2025-01-10 PROCEDURE — 27093 INJECTION FOR HIP X-RAY: CPT

## 2025-01-10 PROCEDURE — 73523 X-RAY EXAM HIPS BI 5/> VIEWS: CPT

## 2025-01-10 PROCEDURE — 27093 INJECTION FOR HIP X-RAY: CPT | Mod: LT

## 2025-01-10 PROCEDURE — G2211 COMPLEX E/M VISIT ADD ON: CPT

## 2025-03-03 ENCOUNTER — APPOINTMENT (OUTPATIENT)
Dept: RHEUMATOLOGY | Facility: CLINIC | Age: 70
End: 2025-03-03

## 2025-03-26 ENCOUNTER — APPOINTMENT (OUTPATIENT)
Dept: PULMONOLOGY | Facility: CLINIC | Age: 70
End: 2025-03-26
Payer: MEDICARE

## 2025-03-26 ENCOUNTER — NON-APPOINTMENT (OUTPATIENT)
Age: 70
End: 2025-03-26

## 2025-03-26 VITALS
OXYGEN SATURATION: 98 % | SYSTOLIC BLOOD PRESSURE: 110 MMHG | BODY MASS INDEX: 25.48 KG/M2 | TEMPERATURE: 97.7 F | WEIGHT: 182 LBS | DIASTOLIC BLOOD PRESSURE: 68 MMHG | RESPIRATION RATE: 16 BRPM | HEIGHT: 71 IN | HEART RATE: 71 BPM

## 2025-03-26 DIAGNOSIS — J84.9 INTERSTITIAL PULMONARY DISEASE, UNSPECIFIED: ICD-10-CM

## 2025-03-26 DIAGNOSIS — Z57.9 OCCUPATIONAL EXPOSURE TO UNSPECIFIED RISK FACTOR: ICD-10-CM

## 2025-03-26 DIAGNOSIS — R06.83 SNORING: ICD-10-CM

## 2025-03-26 DIAGNOSIS — J45.30 MILD PERSISTENT ASTHMA, UNCOMPLICATED: ICD-10-CM

## 2025-03-26 DIAGNOSIS — K21.9 GASTRO-ESOPHAGEAL REFLUX DISEASE W/OUT ESOPHAGITIS: ICD-10-CM

## 2025-03-26 DIAGNOSIS — G47.33 OBSTRUCTIVE SLEEP APNEA (ADULT) (PEDIATRIC): ICD-10-CM

## 2025-03-26 DIAGNOSIS — R06.02 SHORTNESS OF BREATH: ICD-10-CM

## 2025-03-26 PROCEDURE — 99214 OFFICE O/P EST MOD 30 MIN: CPT | Mod: 25

## 2025-03-26 PROCEDURE — ZZZZZ: CPT

## 2025-03-26 PROCEDURE — 94727 GAS DIL/WSHOT DETER LNG VOL: CPT

## 2025-03-26 PROCEDURE — 94010 BREATHING CAPACITY TEST: CPT

## 2025-03-26 PROCEDURE — 94618 PULMONARY STRESS TESTING: CPT

## 2025-03-26 PROCEDURE — 94729 DIFFUSING CAPACITY: CPT

## 2025-03-26 SDOH — HEALTH STABILITY - PHYSICAL HEALTH: OCCUPATIONAL EXPOSURE TO UNSPECIFIED RISK FACTOR: Z57.9

## 2025-03-27 ENCOUNTER — TRANSCRIPTION ENCOUNTER (OUTPATIENT)
Age: 70
End: 2025-03-27

## 2025-03-27 LAB
ALBUMIN SERPL ELPH-MCNC: 4.5 G/DL
ALP BLD-CCNC: 95 U/L
ALT SERPL-CCNC: 14 U/L
AST SERPL-CCNC: 16 U/L
BILIRUB DIRECT SERPL-MCNC: 0.1 MG/DL
BILIRUB INDIRECT SERPL-MCNC: 0.2 MG/DL
BILIRUB SERPL-MCNC: 0.4 MG/DL
PROT SERPL-MCNC: 7.2 G/DL

## 2025-04-09 ENCOUNTER — NON-APPOINTMENT (OUTPATIENT)
Age: 70
End: 2025-04-09

## 2025-04-09 DIAGNOSIS — R93.89 ABNORMAL FINDINGS ON DIAGNOSTIC IMAGING OF OTHER SPECIFIED BODY STRUCTURES: ICD-10-CM

## 2025-04-09 RX ORDER — NINTEDANIB 100 MG/1
100 CAPSULE ORAL
Qty: 60 | Refills: 5 | Status: DISCONTINUED | COMMUNITY
Start: 2025-04-07 | End: 2025-04-09

## 2025-04-22 ENCOUNTER — APPOINTMENT (OUTPATIENT)
Dept: ORTHOPEDIC SURGERY | Facility: CLINIC | Age: 70
End: 2025-04-22
Payer: MEDICARE

## 2025-04-22 VITALS — WEIGHT: 182 LBS | HEIGHT: 71 IN | BODY MASS INDEX: 25.48 KG/M2

## 2025-04-22 DIAGNOSIS — M16.0 BILATERAL PRIMARY OSTEOARTHRITIS OF HIP: ICD-10-CM

## 2025-04-22 PROCEDURE — G2211 COMPLEX E/M VISIT ADD ON: CPT

## 2025-04-22 PROCEDURE — 99215 OFFICE O/P EST HI 40 MIN: CPT

## 2025-06-09 ENCOUNTER — OUTPATIENT (OUTPATIENT)
Dept: OUTPATIENT SERVICES | Facility: HOSPITAL | Age: 70
LOS: 1 days | End: 2025-06-09
Payer: MEDICARE

## 2025-06-09 VITALS
HEIGHT: 71 IN | DIASTOLIC BLOOD PRESSURE: 82 MMHG | HEART RATE: 68 BPM | SYSTOLIC BLOOD PRESSURE: 133 MMHG | RESPIRATION RATE: 18 BRPM | WEIGHT: 182.98 LBS | TEMPERATURE: 98 F | OXYGEN SATURATION: 97 %

## 2025-06-09 DIAGNOSIS — Z95.5 PRESENCE OF CORONARY ANGIOPLASTY IMPLANT AND GRAFT: Chronic | ICD-10-CM

## 2025-06-09 DIAGNOSIS — G47.33 OBSTRUCTIVE SLEEP APNEA (ADULT) (PEDIATRIC): ICD-10-CM

## 2025-06-09 DIAGNOSIS — Z98.890 OTHER SPECIFIED POSTPROCEDURAL STATES: Chronic | ICD-10-CM

## 2025-06-09 DIAGNOSIS — M19.90 UNSPECIFIED OSTEOARTHRITIS, UNSPECIFIED SITE: ICD-10-CM

## 2025-06-09 DIAGNOSIS — I25.10 ATHEROSCLEROTIC HEART DISEASE OF NATIVE CORONARY ARTERY WITHOUT ANGINA PECTORIS: ICD-10-CM

## 2025-06-09 DIAGNOSIS — Z01.818 ENCOUNTER FOR OTHER PREPROCEDURAL EXAMINATION: ICD-10-CM

## 2025-06-09 DIAGNOSIS — J44.9 CHRONIC OBSTRUCTIVE PULMONARY DISEASE, UNSPECIFIED: ICD-10-CM

## 2025-06-09 DIAGNOSIS — M16.12 UNILATERAL PRIMARY OSTEOARTHRITIS, LEFT HIP: ICD-10-CM

## 2025-06-09 LAB
A1C WITH ESTIMATED AVERAGE GLUCOSE RESULT: 5.1 % — SIGNIFICANT CHANGE UP (ref 4–5.6)
ANION GAP SERPL CALC-SCNC: 13 MMOL/L — SIGNIFICANT CHANGE UP (ref 5–17)
BUN SERPL-MCNC: 23 MG/DL — SIGNIFICANT CHANGE UP (ref 7–23)
CALCIUM SERPL-MCNC: 9.5 MG/DL — SIGNIFICANT CHANGE UP (ref 8.4–10.5)
CHLORIDE SERPL-SCNC: 104 MMOL/L — SIGNIFICANT CHANGE UP (ref 96–108)
CO2 SERPL-SCNC: 23 MMOL/L — SIGNIFICANT CHANGE UP (ref 22–31)
CREAT SERPL-MCNC: 0.95 MG/DL — SIGNIFICANT CHANGE UP (ref 0.5–1.3)
EGFR: 86 ML/MIN/1.73M2 — SIGNIFICANT CHANGE UP
EGFR: 86 ML/MIN/1.73M2 — SIGNIFICANT CHANGE UP
ESTIMATED AVERAGE GLUCOSE: 100 MG/DL — SIGNIFICANT CHANGE UP (ref 68–114)
GLUCOSE SERPL-MCNC: 92 MG/DL — SIGNIFICANT CHANGE UP (ref 70–99)
HCT VFR BLD CALC: 42.7 % — SIGNIFICANT CHANGE UP (ref 39–50)
HGB BLD-MCNC: 14.6 G/DL — SIGNIFICANT CHANGE UP (ref 13–17)
MCHC RBC-ENTMCNC: 31.3 PG — SIGNIFICANT CHANGE UP (ref 27–34)
MCHC RBC-ENTMCNC: 34.2 G/DL — SIGNIFICANT CHANGE UP (ref 32–36)
MCV RBC AUTO: 91.4 FL — SIGNIFICANT CHANGE UP (ref 80–100)
MRSA PCR RESULT.: SIGNIFICANT CHANGE UP
NRBC BLD AUTO-RTO: 0 /100 WBCS — SIGNIFICANT CHANGE UP (ref 0–0)
PLATELET # BLD AUTO: 194 K/UL — SIGNIFICANT CHANGE UP (ref 150–400)
POTASSIUM SERPL-MCNC: 4.3 MMOL/L — SIGNIFICANT CHANGE UP (ref 3.5–5.3)
POTASSIUM SERPL-SCNC: 4.3 MMOL/L — SIGNIFICANT CHANGE UP (ref 3.5–5.3)
RBC # BLD: 4.67 M/UL — SIGNIFICANT CHANGE UP (ref 4.2–5.8)
RBC # FLD: 11.9 % — SIGNIFICANT CHANGE UP (ref 10.3–14.5)
S AUREUS DNA NOSE QL NAA+PROBE: SIGNIFICANT CHANGE UP
SODIUM SERPL-SCNC: 140 MMOL/L — SIGNIFICANT CHANGE UP (ref 135–145)
WBC # BLD: 6.17 K/UL — SIGNIFICANT CHANGE UP (ref 3.8–10.5)
WBC # FLD AUTO: 6.17 K/UL — SIGNIFICANT CHANGE UP (ref 3.8–10.5)

## 2025-06-09 PROCEDURE — 87640 STAPH A DNA AMP PROBE: CPT

## 2025-06-09 PROCEDURE — 83036 HEMOGLOBIN GLYCOSYLATED A1C: CPT

## 2025-06-09 PROCEDURE — G0463: CPT

## 2025-06-09 PROCEDURE — 80048 BASIC METABOLIC PNL TOTAL CA: CPT

## 2025-06-09 PROCEDURE — 85027 COMPLETE CBC AUTOMATED: CPT

## 2025-06-09 PROCEDURE — 87641 MR-STAPH DNA AMP PROBE: CPT

## 2025-06-09 RX ORDER — LIDOCAINE HCL/PF 20 MG/ML
0.2 AMPUL, LUER TIP INJECTION ONCE
Refills: 0 | Status: DISCONTINUED | OUTPATIENT
Start: 2025-06-30 | End: 2025-06-30

## 2025-06-09 RX ORDER — CEFAZOLIN SODIUM IN 0.9 % NACL 3 G/100 ML
2000 INTRAVENOUS SOLUTION, PIGGYBACK (ML) INTRAVENOUS ONCE
Refills: 0 | Status: COMPLETED | OUTPATIENT
Start: 2025-06-30 | End: 2025-06-30

## 2025-06-09 RX ORDER — TRAMADOL HYDROCHLORIDE 50 MG/1
50 TABLET, FILM COATED ORAL ONCE
Refills: 0 | Status: DISCONTINUED | OUTPATIENT
Start: 2025-06-30 | End: 2025-06-30

## 2025-06-09 NOTE — H&P PST ADULT - PROBLEM SELECTOR PLAN 1
Scheduled for left total hip arthroplasty direct anterior approach with Dr. Dada Myers on 6/30/25.  Preop instructions and chlorh wash provided.  Questions answered.  CBC, BMP, A1C, MRSA done today.

## 2025-06-09 NOTE — H&P PST ADULT - NS PRO FEM  PAP SMEARS 3YRS
"              After Visit Summary   5/25/2017    Jamar Ivory    MRN: [id number]           Patient Information     Date Of Birth          1949        Visit Information        Provider Department      5/25/2017 11:00 AM Jamar Womack MD [facility]        Today's Diagnoses     Need for prophylactic vaccination against Streptococcus pneumoniae (pneumococcus)    -  1    Edema, unspecified type        Acute bronchitis, unspecified organism        Diabetes mellitus without complication (H)           Follow-ups after your visit        Who to contact     If you have questions or need follow up information about today's clinic visit or your schedule please contact [facility] directly at [phone].  Normal or non-critical lab and imaging results will be communicated to you by MyChart, letter or phone within 4 business days after the clinic has received the results. If you do not hear from us within 7 days, please contact the clinic through MyChart or phone. If you have a critical or abnormal lab result, we will notify you by phone as soon as possible.  Submit refill requests through [employer] or call your pharmacy and they will forward the refill request to us. Please allow 3 business days for your refill to be completed.          Additional Information About Your Visit        MyChart Information     [employer] lets you send messages to your doctor, view your test results, renew your prescriptions, schedule appointments and more. To sign up, go to [email or web address] . Click on \"Log in\" on the left side of the screen, which will take you to the Welcome page. Then click on \"Sign up Now\" on the right side of the page.     You will be asked to enter the access code listed below, as well as some personal information. Please follow the directions to create your username and password.     Your access code is: [email or web address]  Expires: " 2017  1:42 PM     Your access code will  in 90 days. If you need help or a new code, please call your [facility] clinic or [phone].        Care EveryWhere ID     This is your Care EveryWhere ID. This could be used by other organizations to access your [facility] medical records  [id number]        Your Vitals Were     Pulse Temperature Respirations Pulse Oximetry BMI (Body Mass Index)       73 98.1  F (36.7  C) 18 93% 46.83 kg/m2        Blood Pressure from Last 3 Encounters:   17 116/70   17 112/70   17 126/74    Weight from Last 3 Encounters:   17 (!) 308 lb (139.7 kg)   17 (!) 341 lb (154.7 kg)   17 (!) 350 lb (158.8 kg)              We Performed the Following     Basic metabolic panel     HEMOGLOBIN A1C          Today's Medication Changes          These changes are accurate as of: 17  1:42 PM.  If you have any questions, ask your nurse or doctor.               Start taking these medicines.        Dose/Directions    azithromycin 250 MG tablet   Commonly known as:  ZITHROMAX   Used for:  Acute bronchitis, unspecified organism   Started by:  Jamar Womack MD        Two tablets first day, then one tablet daily for four days.   Quantity:  6 tablet   Refills:  0       mupirocin 2 % cream   Commonly known as:  BACTROBAN   Used for:  Edema, unspecified type   Started by:  Jamar Womack MD        Apply topically 3 times daily   Quantity:  30 g   Refills:  1         These medicines have changed or have updated prescriptions.        Dose/Directions    aspirin 325 MG EC tablet   This may have changed:  how much to take   Used for:  CVA (cerebral infarction)        Dose:  325 mg   Take 1 tablet (325 mg) by mouth daily   Quantity:  90 tablet   Refills:  3            Where to get your medicines      These medications were sent to [facility] [address] [place], [place] - [address] [place], [place] [id number]     Phone:  [phone]      azithromycin 250 MG tablet    furosemide 40 MG tablet    mupirocin 2 % cream                Primary Care Provider Office Phone # Fax #    Jamar Sherwin Womack [phone]       [facility] TELMA MURO [address]  [facility] [address]        Thank you!     Thank you for choosing [facility]  for your care. Our goal is always to provide you with excellent care. Hearing back from our patients is one way we can continue to improve our services. Please take a few minutes to complete the written survey that you may receive in the mail after your visit with us. Thank you!             Your Updated Medication List - Protect others around you: Learn how to safely use, store and throw away your medicines at www.disposemymeds.org.          This list is accurate as of: 5/25/17  1:42 PM.  Always use your most recent med list.                   Brand Name Dispense Instructions for use    acetaminophen 325 MG tablet    TYLENOL    100 tablet    Take 2 tablets (650 mg) by mouth every 6 hours as needed for mild pain       aspirin 325 MG EC tablet     90 tablet    Take 1 tablet (325 mg) by mouth daily       azithromycin 250 MG tablet    ZITHROMAX    6 tablet    Two tablets first day, then one tablet daily for four days.       buPROPion 150 MG 24 hr tablet    WELLBUTRIN XL    30 tablet    Take 1 tablet (150 mg) by mouth every morning       finasteride 5 MG tablet    PROSCAR    30 tablet    Take 1 tablet (5 mg) by mouth daily       furosemide 40 MG tablet    LASIX    90 tablet    New dose--2 in the morning and one in the evening.       gabapentin 100 MG capsule    NEURONTIN     Take 100 mg by mouth       Glucosamine-Chondroitin--200-150 MG Tabs          HYDROcodone-acetaminophen 5-325 MG per tablet    NORCO    70 tablet    1-2 tabs four times a day.       ibuprofen 800 MG tablet    ADVIL/MOTRIN    60 tablet    Take 1 tablet (800 mg) by mouth every 8 hours as needed for  pain       indomethacin 50 MG capsule    INDOCIN    15 capsule    Take 1 capsule (50 mg) by mouth 3 times daily (with meals) for 5 days       * levothyroxine 175 MCG tablet    SYNTHROID/LEVOTHROID    30 tablet    TAKE 1 TABLET (175 MCG) BY MOUTH DAILY       * levothyroxine 200 MCG tablet    SYNTHROID/LEVOTHROID    90 tablet    Take 1 tablet (200 mcg) by mouth daily       LEXAPRO 20 MG tablet   Generic drug:  escitalopram      Take 20 mg by mouth       linaclotide 145 MCG capsule    LINZESS    30 capsule    Take 1 capsule (145 mcg) by mouth every morning (before breakfast)       metoprolol 25 MG 24 hr tablet    TOPROL-XL    90 tablet    TAKE ONE TABLET BY MOUTH ONE TIME DAILY       mupirocin 2 % cream    BACTROBAN    30 g    Apply topically 3 times daily       order for DME     100 strip    Equipment being ordered: blood glucose monitor strips to use 2 to 4 times  A day. As covered by insurance. #100 with 5 refills  Monitor ordered this day  As well.  Also lancets #100 use as needed for testing 5 refils       potassium chloride SA 10 MEQ CR tablet    K-DUR/KLOR-CON M    30 tablet    Take 1 tablet (10 mEq) by mouth daily       * Notice:  This list has 2 medication(s) that are the same as other medications prescribed for you. Read the directions carefully, and ask your doctor or other care provider to review them with you.       not applicable (Male)

## 2025-06-09 NOTE — H&P PST ADULT - HISTORY OF PRESENT ILLNESS
70 year old male presents to PST for scheduled left total hip arthroplasty direct anterior approach with Dr. Dada Myers on 6/30/25. PMH CAD (11/2018), PCI in LAD, prox LAD (7/2021), cardiac cath (6/2024), LAD stent 95% lasion in ramus s/p DAMIAN, COPD, TONI (recommended DD, non-compliant), PND, asthma,  GIB (12/2023), GERD, telangiectasias s/p embolization (12/2023), HLD. Denies N/ V, SOB, ZIEGLER, chest pain or palpitations.  70 year old male presents to PST for scheduled left total hip arthroplasty direct anterior approach with Dr. Dada Myers on 6/30/25. 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 Denies N/ V, SOB, ZIEGLER, chest pain or palpitations.

## 2025-06-09 NOTE — H&P PST ADULT - NSICDXPASTMEDICALHX_GEN_ALL_CORE_FT
PAST MEDICAL HISTORY:  Asthma     CAD (coronary artery disease)     COPD (chronic obstructive pulmonary disease)     GERD (gastroesophageal reflux disease)     GIB (gastrointestinal bleeding)     OA (osteoarthritis)     TONI (obstructive sleep apnea)

## 2025-06-09 NOTE — H&P PST ADULT - ASSESSMENT
DASI score:  DASI activity:  Loose teeth or denture:      CAPRINI SCORE    AGE RELATED RISK FACTORS                                                             [ ] Age 41-60 years                                            (1 Point)  [ ] Age: 61-74 years                                           (2 Points)                 [ ] Age= 75 years                                                (3 Points)             DISEASE RELATED RISK FACTORS                                                       [ ] Edema in the lower extremities                 (1 Point)                     [ ] Varicose veins                                               (1 Point)                                 [ ] BMI > 25 Kg/m2                                            (1 Point)                                  [ ] Serious infection (ie PNA)                            (1 Point)                     [ ] Lung disease ( COPD, Emphysema)            (1 Point)                                                                          [ ] Acute myocardial infarction                         (1 Point)                  [ ] Congestive heart failure (in the previous month)  (1 Point)         [ ] Inflammatory bowel disease                            (1 Point)                  [ ] Central venous access, PICC or Port               (2 points)       (within the last month)                                                                [ ] Stroke (in the previous month)                        (5 Points)    [ ] Previous or present malignancy                       (2 points)                                                                                                                                                         HEMATOLOGY RELATED FACTORS                                                         [ ] Prior episodes of VTE                                     (3 Points)                     [ ] Positive family history for VTE                      (3 Points)                  [ ] Prothrombin 22925 A                                     (3 Points)                     [ ] Factor V Leiden                                                (3 Points)                        [ ] Lupus anticoagulants                                      (3 Points)                                                           [ ] Anticardiolipin antibodies                              (3 Points)                                                       [ ] High homocysteine in the blood                   (3 Points)                                             [ ] Other congenital or acquired thrombophilia      (3 Points)                                                [ ] Heparin induced thrombocytopenia                  (3 Points)                                        MOBILITY RELATED FACTORS  [ ] Bed rest                                                         (1 Point)  [ ] Plaster cast                                                    (2 points)  [ ] Bed bound for more than 72 hours           (2 Points)    GENDER SPECIFIC FACTORS  [ ] Pregnancy or had a baby within the last month   (1 Point)  [ ] Post-partum < 6 weeks                                   (1 Point)  [ ] Hormonal therapy  or oral contraception   (1 Point)  [ ] History of pregnancy complications              (1 point)  [ ] Unexplained or recurrent              (1 Point)    OTHER RISK FACTORS                                           (1 Point)  [ ] BMI >40, smoking, diabetes requiring insulin, chemotherapy  blood transfusions and length of surgery over 2 hours    SURGERY RELATED RISK FACTORS  [ ]  Section within the last month     (1 Point)  [ ] Minor surgery                                                  (1 Point)  [ ] Arthroscopic surgery                                       (2 Points)  [ ] Planned major surgery lasting more            (2 Points)      than 45 minutes     [ ] Elective hip or knee joint replacement       (5 points)       surgery                                                TRAUMA RELATED RISK FACTORS  [ ] Fracture of the hip, pelvis, or leg                       (5 Points)  [ ] Spinal cord injury resulting in paralysis             (5 points)       (in the previous month)    [ ] Paralysis  (less than 1 month)                             (5 Points)  [ ] Multiple Trauma within 1 month                        (5 Points)    Total Score [        ]    Caprini Score 0-2: Low Risk, NO VTE prophylaxis required for most patients, encourage ambulation  Caprini Score 3-6: Moderate Risk , pharmacologic VTE prophylaxis is indicated for most patients (in the absence of contraindications)  Caprini Score Greater than or =7: High risk, pharmocologic VTE prophylaxis indicated for most patients (in the absence of contraindications)                               DASI score: 8  DASI activity: able to perform ADL, stairs, ambulates, plays golf without SOB or ZIEGLER   Loose teeth or denture: denies       CAPRINI SCORE    AGE RELATED RISK FACTORS                                                             [ ] Age 41-60 years                                            (1 Point)  [x ] Age: 61-74 years                                           (2 Points)                 [ ] Age= 75 years                                                (3 Points)             DISEASE RELATED RISK FACTORS                                                       [ ] Edema in the lower extremities                 (1 Point)                     [ ] Varicose veins                                               (1 Point)                                 [ x] BMI > 25 Kg/m2                                            (1 Point)                                  [ ] Serious infection (ie PNA)                            (1 Point)                     [x ] Lung disease ( COPD, Emphysema)            (1 Point)                                                                          [ ] Acute myocardial infarction                         (1 Point)                  [ ] Congestive heart failure (in the previous month)  (1 Point)         [ ] Inflammatory bowel disease                            (1 Point)                  [ ] Central venous access, PICC or Port               (2 points)       (within the last month)                                                                [ ] Stroke (in the previous month)                        (5 Points)    [ ] Previous or present malignancy                       (2 points)                                                                                                                                                         HEMATOLOGY RELATED FACTORS                                                         [ ] Prior episodes of VTE                                     (3 Points)                     [ ] Positive family history for VTE                      (3 Points)                  [ ] Prothrombin 85226 A                                     (3 Points)                     [ ] Factor V Leiden                                                (3 Points)                        [ ] Lupus anticoagulants                                      (3 Points)                                                           [ ] Anticardiolipin antibodies                              (3 Points)                                                       [ ] High homocysteine in the blood                   (3 Points)                                             [ ] Other congenital or acquired thrombophilia      (3 Points)                                                [ ] Heparin induced thrombocytopenia                  (3 Points)                                        MOBILITY RELATED FACTORS  [ ] Bed rest                                                         (1 Point)  [ ] Plaster cast                                                    (2 points)  [ ] Bed bound for more than 72 hours           (2 Points)    GENDER SPECIFIC FACTORS  [ ] Pregnancy or had a baby within the last month   (1 Point)  [ ] Post-partum < 6 weeks                                   (1 Point)  [ ] Hormonal therapy  or oral contraception   (1 Point)  [ ] History of pregnancy complications              (1 point)  [ ] Unexplained or recurrent              (1 Point)    OTHER RISK FACTORS                                           (1 Point)  [ ] BMI >40, smoking, diabetes requiring insulin, chemotherapy  blood transfusions and length of surgery over 2 hours    SURGERY RELATED RISK FACTORS  [ ]  Section within the last month     (1 Point)  [ ] Minor surgery                                                  (1 Point)  [ ] Arthroscopic surgery                                       (2 Points)  [ ] Planned major surgery lasting more            (2 Points)      than 45 minutes     [x ] Elective hip or knee joint replacement       (5 points)       surgery                                                TRAUMA RELATED RISK FACTORS  [ ] Fracture of the hip, pelvis, or leg                       (5 Points)  [ ] Spinal cord injury resulting in paralysis             (5 points)       (in the previous month)    [ ] Paralysis  (less than 1 month)                             (5 Points)  [ ] Multiple Trauma within 1 month                        (5 Points)    Total Score [    9    ]    Caprini Score 0-2: Low Risk, NO VTE prophylaxis required for most patients, encourage ambulation  Caprini Score 3-6: Moderate Risk , pharmacologic VTE prophylaxis is indicated for most patients (in the absence of contraindications)  Caprini Score Greater than or =7: High risk, pharmocologic VTE prophylaxis indicated for most patients (in the absence of contraindications)

## 2025-06-10 ENCOUNTER — NON-APPOINTMENT (OUTPATIENT)
Age: 70
End: 2025-06-10

## 2025-06-10 PROBLEM — K21.9 GASTRO-ESOPHAGEAL REFLUX DISEASE WITHOUT ESOPHAGITIS: Chronic | Status: ACTIVE | Noted: 2025-06-09

## 2025-06-10 PROBLEM — J45.909 UNSPECIFIED ASTHMA, UNCOMPLICATED: Chronic | Status: ACTIVE | Noted: 2025-06-09

## 2025-06-10 PROBLEM — M19.90 UNSPECIFIED OSTEOARTHRITIS, UNSPECIFIED SITE: Chronic | Status: ACTIVE | Noted: 2025-06-09

## 2025-06-10 PROBLEM — K92.2 GASTROINTESTINAL HEMORRHAGE, UNSPECIFIED: Chronic | Status: ACTIVE | Noted: 2025-06-09

## 2025-06-10 PROBLEM — G47.33 OBSTRUCTIVE SLEEP APNEA (ADULT) (PEDIATRIC): Chronic | Status: ACTIVE | Noted: 2025-06-09

## 2025-06-10 PROBLEM — I25.10 ATHEROSCLEROTIC HEART DISEASE OF NATIVE CORONARY ARTERY WITHOUT ANGINA PECTORIS: Chronic | Status: ACTIVE | Noted: 2025-06-09

## 2025-06-10 PROBLEM — J44.9 CHRONIC OBSTRUCTIVE PULMONARY DISEASE, UNSPECIFIED: Chronic | Status: ACTIVE | Noted: 2025-06-09

## 2025-06-30 ENCOUNTER — TRANSCRIPTION ENCOUNTER (OUTPATIENT)
Age: 70
End: 2025-06-30

## 2025-06-30 ENCOUNTER — OUTPATIENT (OUTPATIENT)
Dept: INPATIENT UNIT | Facility: HOSPITAL | Age: 70
LOS: 1 days | End: 2025-06-30
Payer: MEDICARE

## 2025-06-30 ENCOUNTER — APPOINTMENT (OUTPATIENT)
Dept: ORTHOPEDIC SURGERY | Facility: HOSPITAL | Age: 70
End: 2025-06-30

## 2025-06-30 VITALS
SYSTOLIC BLOOD PRESSURE: 147 MMHG | TEMPERATURE: 98 F | HEART RATE: 75 BPM | DIASTOLIC BLOOD PRESSURE: 66 MMHG | OXYGEN SATURATION: 97 % | RESPIRATION RATE: 16 BRPM

## 2025-06-30 VITALS
HEIGHT: 71 IN | OXYGEN SATURATION: 97 % | DIASTOLIC BLOOD PRESSURE: 75 MMHG | WEIGHT: 182.98 LBS | SYSTOLIC BLOOD PRESSURE: 117 MMHG | TEMPERATURE: 98 F | RESPIRATION RATE: 16 BRPM | HEART RATE: 66 BPM

## 2025-06-30 DIAGNOSIS — M16.12 UNILATERAL PRIMARY OSTEOARTHRITIS, LEFT HIP: ICD-10-CM

## 2025-06-30 DIAGNOSIS — Z98.890 OTHER SPECIFIED POSTPROCEDURAL STATES: Chronic | ICD-10-CM

## 2025-06-30 DIAGNOSIS — Z95.5 PRESENCE OF CORONARY ANGIOPLASTY IMPLANT AND GRAFT: Chronic | ICD-10-CM

## 2025-06-30 PROCEDURE — 82962 GLUCOSE BLOOD TEST: CPT

## 2025-06-30 PROCEDURE — C1776: CPT

## 2025-06-30 PROCEDURE — 27130 TOTAL HIP ARTHROPLASTY: CPT | Mod: LT

## 2025-06-30 PROCEDURE — 72170 X-RAY EXAM OF PELVIS: CPT

## 2025-06-30 PROCEDURE — 97162 PT EVAL MOD COMPLEX 30 MIN: CPT

## 2025-06-30 DEVICE — LINER ACET TRIDENT X3 0 DEG 36MM F: Type: IMPLANTABLE DEVICE | Site: LEFT | Status: FUNCTIONAL

## 2025-06-30 DEVICE — IMPLANTABLE DEVICE: Type: IMPLANTABLE DEVICE | Site: LEFT | Status: FUNCTIONAL

## 2025-06-30 DEVICE — SHELL ACET TRIDENT II F 56MM: Type: IMPLANTABLE DEVICE | Site: LEFT | Status: FUNCTIONAL

## 2025-06-30 DEVICE — HEAD FEM CER V40 36MM  PLUS 0MM: Type: IMPLANTABLE DEVICE | Site: LEFT | Status: FUNCTIONAL

## 2025-06-30 RX ORDER — HYDROMORPHONE/SOD CHLOR,ISO/PF 2 MG/10 ML
0.5 SYRINGE (ML) INJECTION ONCE
Refills: 0 | Status: DISCONTINUED | OUTPATIENT
Start: 2025-06-30 | End: 2025-06-30

## 2025-06-30 RX ORDER — ACETAMINOPHEN 500 MG/5ML
1000 LIQUID (ML) ORAL ONCE
Refills: 0 | Status: DISCONTINUED | OUTPATIENT
Start: 2025-07-01 | End: 2025-06-30

## 2025-06-30 RX ORDER — ATORVASTATIN CALCIUM 80 MG/1
20 TABLET, FILM COATED ORAL AT BEDTIME
Refills: 0 | Status: DISCONTINUED | OUTPATIENT
Start: 2025-06-30 | End: 2025-06-30

## 2025-06-30 RX ORDER — ASPIRIN 325 MG
1 TABLET ORAL
Qty: 56 | Refills: 0
Start: 2025-06-30 | End: 2025-07-27

## 2025-06-30 RX ORDER — OXYCODONE HYDROCHLORIDE 30 MG/1
10 TABLET ORAL EVERY 4 HOURS
Refills: 0 | Status: DISCONTINUED | OUTPATIENT
Start: 2025-06-30 | End: 2025-06-30

## 2025-06-30 RX ORDER — PRASUGREL HYDROCHLORIDE 10 MG/1
1 TABLET, COATED ORAL
Refills: 0 | DISCHARGE

## 2025-06-30 RX ORDER — ASPIRIN 325 MG
81 TABLET ORAL EVERY 12 HOURS
Refills: 0 | Status: DISCONTINUED | OUTPATIENT
Start: 2025-06-30 | End: 2025-06-30

## 2025-06-30 RX ORDER — ONDANSETRON HCL/PF 4 MG/2 ML
4 VIAL (ML) INJECTION EVERY 6 HOURS
Refills: 0 | Status: DISCONTINUED | OUTPATIENT
Start: 2025-06-30 | End: 2025-06-30

## 2025-06-30 RX ORDER — TRAMADOL HYDROCHLORIDE 50 MG/1
50 TABLET, FILM COATED ORAL EVERY 6 HOURS
Refills: 0 | Status: DISCONTINUED | OUTPATIENT
Start: 2025-06-30 | End: 2025-06-30

## 2025-06-30 RX ORDER — SENNA 187 MG
2 TABLET ORAL AT BEDTIME
Refills: 0 | Status: DISCONTINUED | OUTPATIENT
Start: 2025-06-30 | End: 2025-06-30

## 2025-06-30 RX ORDER — OFLOXACIN 3 MG/ML
1 SOLUTION OPHTHALMIC EVERY 6 HOURS
Refills: 0 | Status: DISCONTINUED | OUTPATIENT
Start: 2025-06-30 | End: 2025-06-30

## 2025-06-30 RX ORDER — OXYCODONE HYDROCHLORIDE 30 MG/1
5 TABLET ORAL EVERY 4 HOURS
Refills: 0 | Status: DISCONTINUED | OUTPATIENT
Start: 2025-06-30 | End: 2025-06-30

## 2025-06-30 RX ORDER — ASPIRIN 325 MG
1 TABLET ORAL
Refills: 0 | DISCHARGE

## 2025-06-30 RX ORDER — ASPIRIN 325 MG
81 TABLET ORAL ONCE
Refills: 0 | Status: COMPLETED | OUTPATIENT
Start: 2025-06-30 | End: 2025-06-30

## 2025-06-30 RX ORDER — ATORVASTATIN CALCIUM 80 MG/1
1 TABLET, FILM COATED ORAL
Refills: 0 | DISCHARGE

## 2025-06-30 RX ORDER — ACETAMINOPHEN 500 MG/5ML
1000 LIQUID (ML) ORAL ONCE
Refills: 0 | Status: DISCONTINUED | OUTPATIENT
Start: 2025-06-30 | End: 2025-06-30

## 2025-06-30 RX ORDER — OXYCODONE HYDROCHLORIDE 30 MG/1
1 TABLET ORAL
Qty: 42 | Refills: 0
Start: 2025-06-30 | End: 2025-07-06

## 2025-06-30 RX ORDER — TRAMADOL HYDROCHLORIDE 50 MG/1
1 TABLET, FILM COATED ORAL
Qty: 28 | Refills: 0
Start: 2025-06-30 | End: 2025-07-06

## 2025-06-30 RX ORDER — POLYETHYLENE GLYCOL 3350 17 G/17G
17 POWDER, FOR SOLUTION ORAL AT BEDTIME
Refills: 0 | Status: DISCONTINUED | OUTPATIENT
Start: 2025-06-30 | End: 2025-06-30

## 2025-06-30 RX ORDER — OFLOXACIN 3 MG/ML
1 SOLUTION OPHTHALMIC
Qty: 0 | Refills: 0 | DISCHARGE
Start: 2025-06-30

## 2025-06-30 RX ORDER — ACETAMINOPHEN 500 MG/5ML
2 LIQUID (ML) ORAL
Qty: 84 | Refills: 0
Start: 2025-06-30 | End: 2025-07-13

## 2025-06-30 RX ORDER — CEFAZOLIN SODIUM IN 0.9 % NACL 3 G/100 ML
2000 INTRAVENOUS SOLUTION, PIGGYBACK (ML) INTRAVENOUS EVERY 8 HOURS
Refills: 0 | Status: COMPLETED | OUTPATIENT
Start: 2025-06-30 | End: 2025-06-30

## 2025-06-30 RX ORDER — ACETAMINOPHEN 500 MG/5ML
1000 LIQUID (ML) ORAL EVERY 8 HOURS
Refills: 0 | Status: DISCONTINUED | OUTPATIENT
Start: 2025-07-01 | End: 2025-06-30

## 2025-06-30 RX ORDER — DEXAMETHASONE 0.5 MG/1
8 TABLET ORAL ONCE
Refills: 0 | Status: DISCONTINUED | OUTPATIENT
Start: 2025-07-01 | End: 2025-06-30

## 2025-06-30 RX ORDER — CEFAZOLIN SODIUM IN 0.9 % NACL 3 G/100 ML
2000 INTRAVENOUS SOLUTION, PIGGYBACK (ML) INTRAVENOUS EVERY 8 HOURS
Refills: 0 | Status: DISCONTINUED | OUTPATIENT
Start: 2025-06-30 | End: 2025-06-30

## 2025-06-30 RX ORDER — TETRACAINE HYDROCHLORIDE 5 MG/ML
1 SOLUTION OPHTHALMIC ONCE
Refills: 0 | Status: COMPLETED | OUTPATIENT
Start: 2025-06-30 | End: 2025-06-30

## 2025-06-30 RX ORDER — MAGNESIUM HYDROXIDE 400 MG/5ML
30 SUSPENSION ORAL DAILY
Refills: 0 | Status: DISCONTINUED | OUTPATIENT
Start: 2025-06-30 | End: 2025-06-30

## 2025-06-30 RX ADMIN — TRAMADOL HYDROCHLORIDE 50 MILLIGRAM(S): 50 TABLET, FILM COATED ORAL at 05:54

## 2025-06-30 RX ADMIN — Medication 100 MILLIGRAM(S): at 15:07

## 2025-06-30 RX ADMIN — TRAMADOL HYDROCHLORIDE 50 MILLIGRAM(S): 50 TABLET, FILM COATED ORAL at 06:27

## 2025-06-30 RX ADMIN — TETRACAINE HYDROCHLORIDE 1 DROP(S): 5 SOLUTION OPHTHALMIC at 12:56

## 2025-06-30 RX ADMIN — Medication 81 MILLIGRAM(S): at 07:00

## 2025-06-30 RX ADMIN — Medication 40 MILLIGRAM(S): at 05:54

## 2025-06-30 RX ADMIN — Medication 500 MILLILITER(S): at 10:20

## 2025-06-30 RX ADMIN — OFLOXACIN 1 DROP(S): 3 SOLUTION OPHTHALMIC at 12:56

## 2025-06-30 NOTE — BRIEF OPERATIVE NOTE - SECOND ASSIST CATEGORY
Reason for Disposition  • Shingles rash (matches SYMPTOMS) and weak immune system (e.g., HIV positive, cancer chemotherapy, chronic steroid treatment, splenectomy) and NOT taking antiviral medication    Answer Assessment - Initial Assessment Questions  1. APPEARANCE of RASH: "Describe the rash."        Upper left thigh rash little pimples right now and its burning       2. LOCATION: "Where is the rash located?"         Left thigh       3. ONSET: "When did the rash start?"         Started last week        4. ITCHING: "Does the rash itch?" If Yes, ask: "How bad is the itch?"  (Scale 1-10; or mild, moderate, severe)       Yes     5. PAIN: "Does the rash hurt?" If Yes, ask: "How bad is the pain?"  (Scale 1-10; or mild, moderate, severe)          Yes pain         6.  OTHER SYMPTOMS: "Do you have any other symptoms?" (e.g., fever)        Denies fever    Protocols used: Shingles-ADULT-[place] No qualified resident/fellow available to assist

## 2025-06-30 NOTE — CONSULT NOTE ADULT - SUBJECTIVE AND OBJECTIVE BOX
[facility] Division of Hospital Medicine  Jacob Ayala MD  Available via MS Teams    HPI:  70 year old male presents to PST for scheduled left total hip arthroplasty direct anterior approach with Dr. Dada Myers on 6/30/25. 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 Denies N/ V, SOB, ZIEGLER, chest pain or palpitations.  (09 Jun 2025 09:49)    Pt seen and examined post operatively in PACU. Pt's main complaint is feeling the need to urinate but not being able to urinate. Denies any chest pain, sob.     PAST MEDICAL & SURGICAL HISTORY:  TONI (obstructive sleep apnea)  CAD (coronary artery disease)  Asthma  COPD (chronic obstructive pulmonary disease)  GERD (gastroesophageal reflux disease)  GIB (gastrointestinal bleeding)  OA (osteoarthritis)  S/P cardiac cath  H/O heart artery stent  S/P bunionectomy    Review of Systems:   As above    Allergies  No Known Allergies  Intolerances    Social History: infrequent cigar use, etoh use    FAMILY HISTORY: noncontributory      MEDICATIONS  (STANDING):  acetaminophen   IVPB .. 1000 milliGRAM(s) IV Intermittent once  aspirin enteric coated 81 milliGRAM(s) Oral every 12 hours  atorvastatin 20 milliGRAM(s) Oral at bedtime  ceFAZolin   IVPB 2000 milliGRAM(s) IV Intermittent every 8 hours  HYDROmorphone  Injectable 0.5 milliGRAM(s) IV Push once  ofloxacin 0.3% Solution 1 Drop(s) Right EYE every 6 hours  pantoprazole    Tablet 40 milliGRAM(s) Oral before breakfast  polyethylene glycol 3350 17 Gram(s) Oral at bedtime  senna 2 Tablet(s) Oral at bedtime  sodium chloride 0.9% Bolus 500 milliLiter(s) IV Bolus once  sodium chloride 0.9% Bolus 500 milliLiter(s) IV Bolus once    MEDICATIONS  (PRN):  HYDROmorphone  Injectable 0.5 milliGRAM(s) IV Push once PRN Moderate Pain (4 - 6)  magnesium hydroxide Suspension 30 milliLiter(s) Oral daily PRN Constipation  ondansetron Injectable 4 milliGRAM(s) IV Push every 6 hours PRN Nausea and/or Vomiting  oxyCODONE    IR 5 milliGRAM(s) Oral every 4 hours PRN Moderate Pain (4 - 6)  oxyCODONE    IR 10 milliGRAM(s) Oral every 4 hours PRN Severe Pain (7 - 10)  traMADol 50 milliGRAM(s) Oral every 6 hours PRN Mild Pain (1 - 3)    Home Medications:  Lipitor 20 mg oral tablet: 1 tab(s) orally once a day (at bedtime) (30 Jun 2025 05:53)  ofloxacin 0.3% ophthalmic solution: 1 drop(s) to each affected eye every 6 hours Your next dose is scheduled for 6/30 at 7:00pm, then 7/1 at 1:00am, your last dose is 7/1 at 7:00am. (30 Jun 2025 13:07)      CAPILLARY BLOOD GLUCOSE      POCT Blood Glucose.: 101 mg/dL (30 Jun 2025 10:04)  POCT Blood Glucose.: 103 mg/dL (30 Jun 2025 06:04)    I&O's Summary    30 Jun 2025 07:01  -  30 Jun 2025 13:18  --------------------------------------------------------  IN: 1220 mL / OUT: 0 mL / NET: 1220 mL        Physical Exam:  Vital Signs Last 24 Hrs  T(C): 36.6 (30 Jun 2025 10:00), Max: 36.6 (30 Jun 2025 10:00)  T(F): 97.9 (30 Jun 2025 10:00), Max: 97.9 (30 Jun 2025 10:00)  HR: 60 (30 Jun 2025 13:00) (50 - 80)  BP: 142/70 (30 Jun 2025 13:00) (117/64 - 152/66)  BP(mean): 98 (30 Jun 2025 13:00) (84 - 99)  RR: 16 (30 Jun 2025 13:00) (16 - 16)  SpO2: 100% (30 Jun 2025 13:00) (95% - 100%)    Parameters below as of 30 Jun 2025 13:00  Patient On (Oxygen Delivery Method): room air    O2 Concentration (%): 21    CONSTITUTIONAL: NAD, well-developed, well-groomed  NECK: Supple, no palpable masses; no thyromegaly  RESPIRATORY: Normal respiratory effort; lungs are clear to auscultation bilaterally  CARDIOVASCULAR: normal S1 and S2, no murmur/rub/gallop; No lower extremity edema  ABDOMEN: Nontender to palpation, normoactive bowel sounds, no rebound/guarding; No hepatosplenomegaly  MUSCULOSKELETAL:  no clubbing or cyanosis of digits; no joint swelling or tenderness to palpation  PSYCH: A+O to person, place, and time; affect appropriate  NEUROLOGY: CN 2-12 are intact and symmetric; no gross sensory deficits   SKIN: No rashes; no palpable lesions    LABS:                      RADIOLOGY & ADDITIONAL TESTS:  Results Reviewed:   Imaging Personally Reviewed:  Electrocardiogram Personally Reviewed:    COORDINATION OF CARE:  Care Discussed with Consultants/Other Providers [Y/N]:  Prior or Outpatient Records Reviewed [Y/N]: [facility] Division of Hospital Medicine  Jacob Ayala MD  Available via MS Teams    HPI:  70 year old male presents to PST for scheduled left total hip arthroplasty direct anterior approach with Dr. Dada Myers on 6/30/25. 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 Denies N/ V, SOB, ZIEGLER, chest pain or palpitations.  (09 Jun 2025 09:49)    Pt seen and examined post operatively in PACU. Pt's main complaint is feeling the need to urinate but not being able to urinate. Denies any chest pain, sob.     PAST MEDICAL & SURGICAL HISTORY:  TONI (obstructive sleep apnea)  CAD (coronary artery disease)  Asthma  COPD (chronic obstructive pulmonary disease)  GERD (gastroesophageal reflux disease)  GIB (gastrointestinal bleeding)  OA (osteoarthritis)  S/P cardiac cath  H/O heart artery stent  S/P bunionectomy    Review of Systems:   As above    Allergies  No Known Allergies  Intolerances    Social History: infrequent cigar use, etoh use    FAMILY HISTORY: noncontributory      MEDICATIONS  (STANDING):  acetaminophen   IVPB .. 1000 milliGRAM(s) IV Intermittent once  aspirin enteric coated 81 milliGRAM(s) Oral every 12 hours  atorvastatin 20 milliGRAM(s) Oral at bedtime  ceFAZolin   IVPB 2000 milliGRAM(s) IV Intermittent every 8 hours  HYDROmorphone  Injectable 0.5 milliGRAM(s) IV Push once  ofloxacin 0.3% Solution 1 Drop(s) Right EYE every 6 hours  pantoprazole    Tablet 40 milliGRAM(s) Oral before breakfast  polyethylene glycol 3350 17 Gram(s) Oral at bedtime  senna 2 Tablet(s) Oral at bedtime  sodium chloride 0.9% Bolus 500 milliLiter(s) IV Bolus once  sodium chloride 0.9% Bolus 500 milliLiter(s) IV Bolus once    MEDICATIONS  (PRN):  HYDROmorphone  Injectable 0.5 milliGRAM(s) IV Push once PRN Moderate Pain (4 - 6)  magnesium hydroxide Suspension 30 milliLiter(s) Oral daily PRN Constipation  ondansetron Injectable 4 milliGRAM(s) IV Push every 6 hours PRN Nausea and/or Vomiting  oxyCODONE    IR 5 milliGRAM(s) Oral every 4 hours PRN Moderate Pain (4 - 6)  oxyCODONE    IR 10 milliGRAM(s) Oral every 4 hours PRN Severe Pain (7 - 10)  traMADol 50 milliGRAM(s) Oral every 6 hours PRN Mild Pain (1 - 3)    Home Medications:  Lipitor 20 mg oral tablet: 1 tab(s) orally once a day (at bedtime) (30 Jun 2025 05:53)  ofloxacin 0.3% ophthalmic solution: 1 drop(s) to each affected eye every 6 hours Your next dose is scheduled for 6/30 at 7:00pm, then 7/1 at 1:00am, your last dose is 7/1 at 7:00am. (30 Jun 2025 13:07)      CAPILLARY BLOOD GLUCOSE      POCT Blood Glucose.: 101 mg/dL (30 Jun 2025 10:04)  POCT Blood Glucose.: 103 mg/dL (30 Jun 2025 06:04)    I&O's Summary    30 Jun 2025 07:01  -  30 Jun 2025 13:18  --------------------------------------------------------  IN: 1220 mL / OUT: 0 mL / NET: 1220 mL        Physical Exam:  Vital Signs Last 24 Hrs  T(C): 36.6 (30 Jun 2025 10:00), Max: 36.6 (30 Jun 2025 10:00)  T(F): 97.9 (30 Jun 2025 10:00), Max: 97.9 (30 Jun 2025 10:00)  HR: 60 (30 Jun 2025 13:00) (50 - 80)  BP: 142/70 (30 Jun 2025 13:00) (117/64 - 152/66)  BP(mean): 98 (30 Jun 2025 13:00) (84 - 99)  RR: 16 (30 Jun 2025 13:00) (16 - 16)  SpO2: 100% (30 Jun 2025 13:00) (95% - 100%)    Parameters below as of 30 Jun 2025 13:00  Patient On (Oxygen Delivery Method): room air    O2 Concentration (%): 21    CONSTITUTIONAL: NAD, well appearing  NECK: Supple, no palpable masses; no thyromegaly  RESPIRATORY: Normal respiratory effort; lungs are clear to auscultation bilaterally  CARDIOVASCULAR: normal S1 and S2, no murmur/rub/gallop; No lower extremity edema  ABDOMEN: soft, nt, nd, mild suprapubic tenderness  MUSCULOSKELETAL:  L hip iced/bandage C/D/I  PSYCH: A+O to person, place, and time; affect appropriate  NEUROLOGY: CN 2-12 are intact and symmetric; no gross sensory deficits   SKIN: No rashes; no palpable lesions    LABS:                      RADIOLOGY & ADDITIONAL TESTS:  Results Reviewed:   Imaging Personally Reviewed:  Electrocardiogram Personally Reviewed:    COORDINATION OF CARE:  Care Discussed with Consultants/Other Providers [Y/N]:  Prior or Outpatient Records Reviewed [Y/N]:

## 2025-06-30 NOTE — PHYSICAL THERAPY INITIAL EVALUATION ADULT - PERTINENT HX OF CURRENT PROBLEM, REHAB EVAL
70 year old male presents to PST for scheduled left total hip arthroplasty direct anterior approach with Dr. Dada Myers on 6/30/25. 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 Denies N/ V, SOB, ZIEGLER, chest pain or palpitations.

## 2025-06-30 NOTE — PROVIDER CONTACT NOTE (OTHER) - RECOMMENDATIONS
Patient instructed to not touch R eye. Patient instructed on Tetracaine eye drops & ofloxacin eye drops. Patient encourged to follow up with outpatient opthamology by NP.

## 2025-06-30 NOTE — PROVIDER CONTACT NOTE (OTHER) - SITUATION
Patient s/p L total hip. on 6/30/25 @ 1257 patient c/o "feeling of sand in eye", R eye is irritated and red looking. Patient denies c/o blurred vision or blindness in R eye.

## 2025-06-30 NOTE — PHYSICAL THERAPY INITIAL EVALUATION ADULT - ADDITIONAL COMMENTS
Pt lives in a private home with wife, 8 steps to enter, 1 flight of stairs inside. Pt performed ADL/IADLs independently. Ambulates with no assistive device. Owns DME: rolling walker

## 2025-06-30 NOTE — ASU DISCHARGE PLAN (ADULT/PEDIATRIC) - FINANCIAL ASSISTANCE
[facility] provides services at a reduced cost to those who are determined to be eligible through [facility]’s financial assistance program. Information regarding [facility]’s financial assistance program can be found by going to [email or web address] or by calling [phone].

## 2025-06-30 NOTE — CHART NOTE - NSCHARTNOTEFT_GEN_A_CORE
Post-Operative Check    Pt evaluated in RR resting without complaints. Pt states pain is well tolerated. No Chest Pain, SOB, N/V.    Vitals:  T(C): 36.6 (06-30-25 @ 10:00), Max: 36.6 (06-30-25 @ 10:00)  HR: 60 (06-30-25 @ 13:00) (50 - 80)  BP: 142/70 (06-30-25 @ 13:00) (117/64 - 152/66)  RR: 16 (06-30-25 @ 13:00) (16 - 16)  SpO2: 100% (06-30-25 @ 13:00) (95% - 100%)    Exam:  Alert and Oriented, No Acute Distress. VSS.   Laterality: LLE     Mepilex dressing is clean, dry and intact.      (+) PF/DF/EHL/FHL 5/5     Sensation intact to light touch      2+ DP/PT pulse  Calves soft, non-tender bilaterally    Xray: < from: Xray Pelvis AP only (06.30.25 @ 09:51) >  IMPRESSION:  Status post left hip arthroplasty. Postsurgical changes include soft   tissue swelling and subcutaneous air. No evidence of prosthetic or   periprosthetic fracture. Severe right hip arthrosis.    A/P: 70y Male s/p Left Total Hip Arthroplasty. VSS. NAD  -PT/OT: WBAT/OOB  -IS bedside  -Ice/elevation   -DVT PPx: Aspirin 81mg BID, SCD, Early OOB and Amb  -GI PPx: Protonix 40mg   -Pain Control  -Bowel regimen  -Dispo planning: OK to DC home from PACU once cleared by PT, voiding, and after 1 dose postop abx    Jaime Anderson PA-C  Orthopedic Surgery Team  Team Pager #[phone]

## 2025-06-30 NOTE — CHART NOTE - NSCHARTNOTEFT_GEN_A_CORE
ORTHO  Patient is a 70y old  Male who presents with a chief complaint of OA left hip  Goal: To be able to be more active and not have pain. (09 Jun 2025 09:49)    Pt examined in PACU, resting without complaint. Pending void     VS-  T(C): 36.6 (06-30-25 @ 10:00), Max: 36.6 (06-30-25 @ 10:00)  HR: 65 (06-30-25 @ 11:45) (50 - 66)  BP: 135/65 (06-30-25 @ 11:45) (117/75 - 152/66)  RR: 16 (06-30-25 @ 11:45) (16 - 16)  SpO2: 98% (06-30-25 @ 11:45) (97% - 100%)  Wt(kg): --    M.S.  Extremity- LLE +mepilex dressing area is soft, no ecchymosis   Neuro- awake/alert oriented x3              Motor- LLE 5/5 strength in all muscle groups              Sensation intact              Pulses 2+ distally   Calves- soft, nontender- PAS                 Impression: Stable       Plan: Continue present treatment                 Out of bed, ambulate, weight bearing as tolerated                  Physical therapy evaluation, follow up                  Continue to monitor                 Dc pending void ORTHO  Patient is a 70y old  Male who presents with a chief complaint of OA left hip  Goal: To be able to be more active and not have pain. (09 Jun 2025 09:49)  Arthroplasty of left hip POD #0   Pt examined in PACU, resting without complaint. Pending void     VS-  T(C): 36.6 (06-30-25 @ 10:00), Max: 36.6 (06-30-25 @ 10:00)  HR: 65 (06-30-25 @ 11:45) (50 - 66)  BP: 135/65 (06-30-25 @ 11:45) (117/75 - 152/66)  RR: 16 (06-30-25 @ 11:45) (16 - 16)  SpO2: 98% (06-30-25 @ 11:45) (97% - 100%)  Wt(kg): --    M.S.  Extremity- LLE +mepilex dressing area is soft, no ecchymosis   Neuro- awake/alert oriented x3              Motor- LLE 5/5 distal strength DF/PF/EHL               Sensation intact              Pulses 2+ distally   Calves- soft, nontender- PAS                 Impression: Stable       Plan: Continue present treatment                 Out of bed, ambulate, weight bearing as tolerated                  Physical therapy evaluation, follow up                  Continue to monitor                 Dc pending void Patient was seen and evaluated at bedside. No chest pain, SOB, N/V/D/HA. patient voided independently PT cleared. c/o R eye pain c/f corneal abrasion seen by PACU NP given eye drops to be continued on discharge. Vision intact    T(C): 36.5 (06-30-25 @ 13:00), Max: 36.6 (06-30-25 @ 10:00)  HR: 83 (06-30-25 @ 14:00) (50 - 86)  BP: 138/61 (06-30-25 @ 14:00) (117/64 - 152/66)  RR: 16 (06-30-25 @ 14:00) (14 - 18)  SpO2: 97% (06-30-25 @ 14:00) (95% - 100%)  Wt(kg): --    Exam:  Alert and Oriented, No Acute Distress  Cardiac: Normal S1 & S2  Pulmonary: normal breathing effort, no retractions, no diminished lung sounds appreciated.  Bronchial/Vesicular lungs sounds appreciated throughout all lung lobes.  Lower Extremities: LEFT Lower Extremity   Dressing: mepilex over L Hip C/D/I  Calves Soft, Non-tender bilaterally  Motor: 5/5 (+) PF/DF/EHL/FHL  Sensory: SILT  2+ DP/PT Pulse  Warm and well-perfused, cap refill < 2 sec    Xray: s/p L Total Hip Arthroplasty       < from: Xray Pelvis AP only (06.30.25 @ 09:51) >  ACC: [id number] EXAM:  XR PELVIS AP ONLY 1-2 VIEWS   ORDERED BY: ERICK MERRILL   PROCEDURE DATE:  06/30/2025    INTERPRETATION:  Clinical indication: Status post left total hip arthroplasty.  AP view of the lower pelvis was performed.    IMPRESSION:  Status post left hip arthroplasty. Postsurgical changes include soft   tissue swelling and subcutaneous air. No evidence of prosthetic or   periprosthetic fracture. Severe right hip arthrosis.    --- End of Report ---  GABE SOW MD; Attending Radiologist  This document has been electronically signed. Jun 30 2025 10:24AM  < end of copied text >    A/p: 70yMale s/p L Hip Arthroplasty. VSS. NAD.  PT/OT- WBAT LLE   IS  DVT PPx: Aspirin 81 mg BID, SCD   GI PPx: Protonix 40 mg QD   Pain Control  Continue Current Tx.  Dispo planning: recommended for outpatient PT     Vashti Rubin PA-C  Orthopedic Surgery   Team Pager: #[phone]

## 2025-06-30 NOTE — CONSULT NOTE ADULT - ASSESSMENT
70M hx of CAD, asthma, TONI, GERD here for L WALLACE  # OA  - s/p L WALLACE  - mgmt per primary team    # CAD  - c/w asa  - unclear why pt is not on statin, f/u outpatient    # Asthma  Home meds: Trelegy ellipta  - Pt is not adherent to inhaler, no wheezing or respiratory complaints  - continue to monitor  70M hx of CAD, asthma, TONI, GERD here for L WALLACE.    # OA  - s/p L WALLACE  - mgmt per primary team    # Urinary urgency  - monitor for retention with PVR    # CAD  - c/w asa  - unclear why pt is not on statin, f/u outpatient    # Asthma  Home meds: Trelegy ellipta  - Pt is not adherent to inhaler, no wheezing or respiratory complaints  - continue to monitor

## 2025-06-30 NOTE — ASU DISCHARGE PLAN (ADULT/PEDIATRIC) - CARE PROVIDER_API CALL
Dada Myers  Orthopaedic Surgery  [address], [address]  [place] [address]  Phone: [phone]  Fax: [phone]  Follow Up Time: 2 weeks

## 2025-06-30 NOTE — PROVIDER CONTACT NOTE (OTHER) - ASSESSMENT
Patient s/p L total hip. on 6/30/25 @ 1257 patient c/o "feeling of sand in eye", R eye is irritated and red looking. Patient denies c/o blurred vision or blindness in R eye. NP familia mariscal called to bedside and notified regarding patient complaint and nursing assessment of R eye.

## 2025-06-30 NOTE — PRE-ANESTHESIA EVALUATION ADULT - NSANTHPMHFT_GEN_ALL_CORE
chart and consultant notes reviewed. cad s/p serial stents, last 6/24 - reports stable cv clinical status since. good et without cp/sob. effient last 9d ago, asa last 5d ago (given today). non compliant TONI. gerd, mild

## 2025-06-30 NOTE — CONSULT NOTE ADULT - NSCONSULTADDITIONALINFOA_GEN_ALL_CORE
The necessity of the time spent during the encounter on this date of service was due to:   - Ordering, reviewing, and interpreting labs, testing, and imaging  - Independently obtaining a review of systems and performing a physical exam  - Reviewing prior hospitalization and where necessary, outpatient records  - Reviewing consultant recommendations/communicating with consultants  - Counselling and educating patient and family regarding interpretation of aforementioned items and plan of care  - Time does not include teaching    Time-based billing (NON-critical care). Total minutes spent: 55

## 2025-06-30 NOTE — CHART NOTE - NSCHARTNOTEFT_GEN_A_CORE
Called to bedside by RN, patient endorsing right eye discomfort. Patient states, "It feels like there is something in my eye". Tetracaine 0.5% opthalmic solution administered with relief of right eye discomfort. On exam right eye sclera red, with tearing present. Patient presentation consistent with corneal abrasion. Additionally given 1 drop of ofloxacin 0.3% ophthalmic solution. Will continue with ofloxacin ophthalmic q6. hours x 24 hours. Patient educated that symptoms should resolve in 2-3 days. If symptoms do not improve or get worse, seek follow up care. No other patient concerns disclosed, will continue to monitor. yes

## 2025-06-30 NOTE — ASU DISCHARGE PLAN (ADULT/PEDIATRIC) - ASU DC SPECIAL INSTRUCTIONSFT
May shower right away (bandage is waterproof)...Remove bandage 10 days after surgery and you may continue to shower after bandage removal...For at least the first week after surgery, you should take 1,000mg of Tylenol with breakfast, lunch and dinner .This should be taken regardless of pain level...For moderate pain take Tramadol and for more severe pain use Oxycodone. May shower right away (bandage is waterproof)...Remove bandage 10 days after surgery and you may continue to shower after bandage removal...For at least the first week after surgery, you should take 1,000mg of Tylenol with breakfast, lunch and dinner .This should be taken regardless of pain level...For moderate pain take Tramadol and for more severe pain use Oxycodone.    From Anesthesia Department:  During your post-operative stay you sustained a corneal abrasion to your right eye. As instructed please continue ofloxacin (antibiotic drops) every 6 hours x 24 hours. Your next dose is scheduled for 6/30 at 7:00pm, then 7/1 at 1:00am, you last dose is 7/1 at 7:00am.   It should take approximately 2-3 days for the corneal abrasion to heal. In the event that your symptoms do not improve or become worse please seek follow up care for evaluation.  Please do not wear contact lenses or touch eye while healing.

## 2025-06-30 NOTE — PATIENT PROFILE ADULT - FALL HARM RISK - UNIVERSAL INTERVENTIONS
Bed in lowest position, wheels locked, appropriate side rails in place/Call bell, personal items and telephone in reach/Instruct patient to call for assistance before getting out of bed or chair/Non-slip footwear when patient is out of bed/[place] to call system/Physically safe environment - no spills, clutter or unnecessary equipment/Purposeful Proactive Rounding/Room/bathroom lighting operational, light cord in reach

## 2025-07-07 ENCOUNTER — NON-APPOINTMENT (OUTPATIENT)
Age: 70
End: 2025-07-07

## 2025-07-15 ENCOUNTER — APPOINTMENT (OUTPATIENT)
Dept: ORTHOPEDIC SURGERY | Facility: CLINIC | Age: 70
End: 2025-07-15
Payer: MEDICARE

## 2025-07-15 VITALS — WEIGHT: 182 LBS | BODY MASS INDEX: 25.48 KG/M2 | HEIGHT: 71 IN

## 2025-07-15 PROCEDURE — 99024 POSTOP FOLLOW-UP VISIT: CPT

## 2025-07-15 PROCEDURE — 73502 X-RAY EXAM HIP UNI 2-3 VIEWS: CPT

## 2025-07-31 ENCOUNTER — APPOINTMENT (OUTPATIENT)
Dept: PULMONOLOGY | Facility: CLINIC | Age: 70
End: 2025-07-31
Payer: MEDICARE

## 2025-07-31 VITALS
WEIGHT: 184 LBS | HEIGHT: 71 IN | RESPIRATION RATE: 16 BRPM | DIASTOLIC BLOOD PRESSURE: 71 MMHG | OXYGEN SATURATION: 98 % | BODY MASS INDEX: 25.76 KG/M2 | TEMPERATURE: 97.3 F | SYSTOLIC BLOOD PRESSURE: 120 MMHG | HEART RATE: 70 BPM

## 2025-07-31 DIAGNOSIS — R09.82 POSTNASAL DRIP: ICD-10-CM

## 2025-07-31 DIAGNOSIS — J84.9 INTERSTITIAL PULMONARY DISEASE, UNSPECIFIED: ICD-10-CM

## 2025-07-31 DIAGNOSIS — Z57.9 OCCUPATIONAL EXPOSURE TO UNSPECIFIED RISK FACTOR: ICD-10-CM

## 2025-07-31 DIAGNOSIS — R93.89 ABNORMAL FINDINGS ON DIAGNOSTIC IMAGING OF OTHER SPECIFIED BODY STRUCTURES: ICD-10-CM

## 2025-07-31 DIAGNOSIS — G47.33 OBSTRUCTIVE SLEEP APNEA (ADULT) (PEDIATRIC): ICD-10-CM

## 2025-07-31 DIAGNOSIS — J45.30 MILD PERSISTENT ASTHMA, UNCOMPLICATED: ICD-10-CM

## 2025-07-31 DIAGNOSIS — K21.9 GASTRO-ESOPHAGEAL REFLUX DISEASE W/OUT ESOPHAGITIS: ICD-10-CM

## 2025-07-31 DIAGNOSIS — R06.83 SNORING: ICD-10-CM

## 2025-07-31 DIAGNOSIS — R06.02 SHORTNESS OF BREATH: ICD-10-CM

## 2025-07-31 PROCEDURE — 94727 GAS DIL/WSHOT DETER LNG VOL: CPT

## 2025-07-31 PROCEDURE — 95012 NITRIC OXIDE EXP GAS DETER: CPT

## 2025-07-31 PROCEDURE — 94010 BREATHING CAPACITY TEST: CPT

## 2025-07-31 PROCEDURE — ZZZZZ: CPT

## 2025-07-31 PROCEDURE — 99215 OFFICE O/P EST HI 40 MIN: CPT | Mod: 25

## 2025-07-31 PROCEDURE — 94729 DIFFUSING CAPACITY: CPT

## 2025-07-31 SDOH — HEALTH STABILITY - PHYSICAL HEALTH: OCCUPATIONAL EXPOSURE TO UNSPECIFIED RISK FACTOR: Z57.9

## 2025-08-04 ENCOUNTER — NON-APPOINTMENT (OUTPATIENT)
Age: 70
End: 2025-08-04

## 2025-08-08 ENCOUNTER — OUTPATIENT (OUTPATIENT)
Dept: OUTPATIENT SERVICES | Facility: HOSPITAL | Age: 70
LOS: 1 days | End: 2025-08-08
Payer: MEDICARE

## 2025-08-08 DIAGNOSIS — Z98.890 OTHER SPECIFIED POSTPROCEDURAL STATES: Chronic | ICD-10-CM

## 2025-08-08 DIAGNOSIS — G47.33 OBSTRUCTIVE SLEEP APNEA (ADULT) (PEDIATRIC): ICD-10-CM

## 2025-08-08 DIAGNOSIS — Z95.5 PRESENCE OF CORONARY ANGIOPLASTY IMPLANT AND GRAFT: Chronic | ICD-10-CM

## 2025-08-08 PROCEDURE — 95800 SLP STDY UNATTENDED: CPT

## 2025-08-08 PROCEDURE — G0400: CPT | Mod: 26

## 2025-08-14 ENCOUNTER — APPOINTMENT (OUTPATIENT)
Dept: ORTHOPEDIC SURGERY | Facility: CLINIC | Age: 70
End: 2025-08-14
Payer: MEDICARE

## 2025-08-14 VITALS — HEIGHT: 71 IN | BODY MASS INDEX: 25.76 KG/M2 | WEIGHT: 184 LBS

## 2025-08-14 DIAGNOSIS — Z96.642 PRESENCE OF LEFT ARTIFICIAL HIP JOINT: ICD-10-CM

## 2025-08-14 PROCEDURE — 99024 POSTOP FOLLOW-UP VISIT: CPT

## 2025-08-22 ENCOUNTER — APPOINTMENT (OUTPATIENT)
Dept: PULMONOLOGY | Facility: CLINIC | Age: 70
End: 2025-08-22
Payer: MEDICARE

## 2025-08-22 PROCEDURE — 99212 OFFICE O/P EST SF 10 MIN: CPT | Mod: 93

## 2025-09-05 ENCOUNTER — APPOINTMENT (OUTPATIENT)
Dept: RADIOLOGY | Facility: CLINIC | Age: 70
End: 2025-09-05
Payer: MEDICARE

## 2025-09-05 ENCOUNTER — RESULT REVIEW (OUTPATIENT)
Age: 70
End: 2025-09-05

## 2025-09-05 ENCOUNTER — OUTPATIENT (OUTPATIENT)
Dept: OUTPATIENT SERVICES | Facility: HOSPITAL | Age: 70
LOS: 1 days | End: 2025-09-05
Payer: MEDICARE

## 2025-09-05 DIAGNOSIS — Z98.890 OTHER SPECIFIED POSTPROCEDURAL STATES: Chronic | ICD-10-CM

## 2025-09-05 DIAGNOSIS — M16.0 BILATERAL PRIMARY OSTEOARTHRITIS OF HIP: ICD-10-CM

## 2025-09-05 DIAGNOSIS — Z95.5 PRESENCE OF CORONARY ANGIOPLASTY IMPLANT AND GRAFT: Chronic | ICD-10-CM

## 2025-09-05 PROCEDURE — 27093 INJECTION FOR HIP X-RAY: CPT

## 2025-09-05 PROCEDURE — 73525 CONTRAST X-RAY OF HIP: CPT

## 2025-09-05 PROCEDURE — 73525 CONTRAST X-RAY OF HIP: CPT | Mod: 26,RT

## 2025-09-05 PROCEDURE — 27093 INJECTION FOR HIP X-RAY: CPT | Mod: RT

## 2025-09-18 ENCOUNTER — APPOINTMENT (OUTPATIENT)
Dept: ORTHOPEDIC SURGERY | Facility: CLINIC | Age: 70
End: 2025-09-18

## (undated) DEVICE — HOOD T7 PLUS PEELAWAY

## (undated) DEVICE — SOL INJ NS 0.9% 1000ML

## (undated) DEVICE — PACK DAA HIP

## (undated) DEVICE — GLV 7.5 PROTEXIS (WHITE)

## (undated) DEVICE — SUT PDO 2 1/2 CIRCLE 40MM NDL 45CM

## (undated) DEVICE — VENODYNE/SCD SLEEVE CALF MEDIUM

## (undated) DEVICE — DRAPE SUPINE ESYSUIT

## (undated) DEVICE — SOL INJ NS 0.9% 500ML 1-PORT

## (undated) DEVICE — GOWN SURGEON VEST

## (undated) DEVICE — WARMING BLANKET UPPER ADULT

## (undated) DEVICE — SAW BLADE STRYKER DUAL CUT 1.27X11 X90MM

## (undated) DEVICE — POSITIONER FOAM HEADREST (PINK)

## (undated) DEVICE — SUT MONOCRYL 2-0 27" SH UNDYED

## (undated) DEVICE — SUT QUILL PDO 1 45CM CTX 48MM

## (undated) DEVICE — LIGHT PIPE

## (undated) DEVICE — DRAPE SURGICAL #1010

## (undated) DEVICE — DRSG MEPILEX 10 X 20CM (4 X 8") POST OP AG SILVER

## (undated) DEVICE — ELCTR BOVIE PENCIL SMOKE EVACUATION

## (undated) DEVICE — GLV 8.5 PROTEXIS (BLUE)

## (undated) DEVICE — SOL IRR POUR NS 0.9% 500ML

## (undated) DEVICE — GLV 8 PROTEXIS (CREAM) NEU-THERA

## (undated) DEVICE — HOOD T7 NON-PEELAWAY

## (undated) DEVICE — SUT QUILL MONODERM 0 1/2 CIRCLE TAPR 45CM 26MM

## (undated) DEVICE — DRSG DERMABOND 0.7ML

## (undated) DEVICE — TUBING TUR 2 PRONG

## (undated) DEVICE — GLV 8.5 PROTEXIS (WHITE)

## (undated) DEVICE — SOL IRR POUR H2O 1000ML

## (undated) DEVICE — STRYKER PULSE LAVAGE WITH HIGH FLOW TIP

## (undated) DEVICE — ELCTR GROUNDING PAD ADULT COVIDIEN

## (undated) DEVICE — SUT QUILL MONODERM 2-0 3/8 CIRCLE 45CM